# Patient Record
Sex: FEMALE | Race: BLACK OR AFRICAN AMERICAN | NOT HISPANIC OR LATINO | Employment: OTHER | URBAN - METROPOLITAN AREA
[De-identification: names, ages, dates, MRNs, and addresses within clinical notes are randomized per-mention and may not be internally consistent; named-entity substitution may affect disease eponyms.]

---

## 2023-04-24 RX ORDER — LISINOPRIL 40 MG/1
TABLET ORAL
Qty: 90 TABLET | Refills: 0 | OUTPATIENT
Start: 2023-04-24

## 2023-06-02 ENCOUNTER — TELEPHONE (OUTPATIENT)
Dept: PRIMARY CARE | Facility: CLINIC | Age: 68
End: 2023-06-02
Payer: MEDICARE

## 2023-06-02 DIAGNOSIS — I10 HYPERTENSION, UNSPECIFIED TYPE: Primary | ICD-10-CM

## 2023-06-02 RX ORDER — LISINOPRIL 40 MG/1
40 TABLET ORAL DAILY
Qty: 90 TABLET | Refills: 0 | Status: SHIPPED | OUTPATIENT
Start: 2023-06-02 | End: 2023-08-29

## 2023-06-02 RX ORDER — LISINOPRIL 40 MG/1
40 TABLET ORAL DAILY
COMMUNITY
Start: 2018-06-13 | End: 2023-06-02 | Stop reason: SDUPTHER

## 2023-07-19 PROBLEM — F32.A DEPRESSION: Status: ACTIVE | Noted: 2023-07-19

## 2023-07-19 PROBLEM — N64.89 BREAST ASYMMETRY IN FEMALE: Status: ACTIVE | Noted: 2023-07-19

## 2023-07-19 PROBLEM — R89.2 ABNORMAL DRUG SCREEN: Status: ACTIVE | Noted: 2023-07-19

## 2023-07-19 PROBLEM — H90.3 SENSORINEURAL HEARING LOSS, BILATERAL: Status: ACTIVE | Noted: 2023-07-19

## 2023-07-19 PROBLEM — M79.9 POSTURAL STRAIN: Status: ACTIVE | Noted: 2023-07-19

## 2023-07-19 PROBLEM — L29.2 VULVAR ITCHING: Status: ACTIVE | Noted: 2023-07-19

## 2023-07-19 PROBLEM — R09.02 HYPOXEMIA: Status: ACTIVE | Noted: 2023-07-19

## 2023-07-19 PROBLEM — N95.1 MENOPAUSAL SYMPTOMS: Status: ACTIVE | Noted: 2023-07-19

## 2023-07-19 PROBLEM — M47.816 LUMBAR SPONDYLOSIS: Status: ACTIVE | Noted: 2023-07-19

## 2023-07-19 PROBLEM — R51.9 HEADACHE: Status: ACTIVE | Noted: 2023-07-19

## 2023-07-19 PROBLEM — R01.1 UNDIAGNOSED CARDIAC MURMURS: Status: ACTIVE | Noted: 2023-07-19

## 2023-07-19 PROBLEM — M25.552 LEFT HIP PAIN: Status: ACTIVE | Noted: 2023-07-19

## 2023-07-19 PROBLEM — M79.7 FIBROMYALGIA: Status: ACTIVE | Noted: 2023-07-19

## 2023-07-19 PROBLEM — H61.21 IMPACTED CERUMEN OF RIGHT EAR: Status: ACTIVE | Noted: 2023-07-19

## 2023-07-19 PROBLEM — R92.8 ABNORMAL MAMMOGRAM: Status: ACTIVE | Noted: 2023-07-19

## 2023-07-19 PROBLEM — I10 BENIGN ESSENTIAL HYPERTENSION: Status: ACTIVE | Noted: 2023-07-19

## 2023-07-19 PROBLEM — N18.9 CKD (CHRONIC KIDNEY DISEASE): Status: ACTIVE | Noted: 2023-07-19

## 2023-07-19 PROBLEM — M19.049 CMC ARTHRITIS: Status: ACTIVE | Noted: 2023-07-19

## 2023-07-19 PROBLEM — M19.90 ARTHRITIS: Status: ACTIVE | Noted: 2023-07-19

## 2023-07-19 PROBLEM — M20.009 FINGER DEFORMITY: Status: ACTIVE | Noted: 2023-07-19

## 2023-07-19 PROBLEM — E55.9 VITAMIN D DEFICIENCY: Status: ACTIVE | Noted: 2023-07-19

## 2023-07-19 PROBLEM — M79.10 MYALGIA: Status: ACTIVE | Noted: 2023-07-19

## 2023-07-19 PROBLEM — M25.50 MULTIPLE JOINT PAIN: Status: ACTIVE | Noted: 2023-07-19

## 2023-07-19 PROBLEM — R06.09 EXERTIONAL DYSPNEA: Status: ACTIVE | Noted: 2023-07-19

## 2023-07-19 PROBLEM — I73.00 RAYNAUD'S DISEASE: Status: ACTIVE | Noted: 2023-07-19

## 2023-07-19 PROBLEM — M32.9 SYSTEMIC LUPUS ERYTHEMATOSUS (MULTI): Status: ACTIVE | Noted: 2023-07-19

## 2023-07-19 PROBLEM — M54.50 LUMBOSACRAL PAIN: Status: ACTIVE | Noted: 2023-07-19

## 2023-07-19 PROBLEM — G56.21: Status: ACTIVE | Noted: 2023-07-19

## 2023-07-19 PROBLEM — I77.9 PERIPHERAL ARTERIAL OCCLUSIVE DISEASE (CMS-HCC): Status: ACTIVE | Noted: 2023-07-19

## 2023-07-19 PROBLEM — M25.512 SHOULDER PAIN, BILATERAL: Status: ACTIVE | Noted: 2023-07-19

## 2023-07-19 PROBLEM — M65.30 TRIGGER FINGER: Status: ACTIVE | Noted: 2023-07-19

## 2023-07-19 PROBLEM — M11.20 CALCIUM PYROPHOSPHATE DEPOSITION DISEASE (CPPD): Status: ACTIVE | Noted: 2023-07-19

## 2023-07-19 PROBLEM — R63.5 WEIGHT GAIN: Status: ACTIVE | Noted: 2023-07-19

## 2023-07-19 PROBLEM — M87.059 AVASCULAR NECROSIS OF HIP (MULTI): Status: ACTIVE | Noted: 2023-07-19

## 2023-07-19 PROBLEM — F43.23 SITUATIONAL MIXED ANXIETY AND DEPRESSIVE DISORDER: Status: ACTIVE | Noted: 2023-07-19

## 2023-07-19 PROBLEM — G47.00 INSOMNIA: Status: ACTIVE | Noted: 2023-07-19

## 2023-07-19 PROBLEM — H91.90 HEARING LOSS: Status: ACTIVE | Noted: 2023-07-19

## 2023-07-19 PROBLEM — D89.2 PARAPROTEINEMIA: Status: ACTIVE | Noted: 2023-07-19

## 2023-07-19 PROBLEM — M99.09 SEGMENTAL AND SOMATIC DYSFUNCTION: Status: ACTIVE | Noted: 2023-07-19

## 2023-07-19 PROBLEM — M25.511 SHOULDER PAIN, BILATERAL: Status: ACTIVE | Noted: 2023-07-19

## 2023-07-19 PROBLEM — H35.00 RETINOPATHY: Status: ACTIVE | Noted: 2023-07-19

## 2023-07-19 PROBLEM — D47.2 MONOCLONAL GAMMOPATHY: Status: ACTIVE | Noted: 2023-07-19

## 2023-07-19 PROBLEM — M25.562 LEFT KNEE PAIN: Status: ACTIVE | Noted: 2023-07-19

## 2023-07-19 RX ORDER — VIT C/E/ZN/COPPR/LUTEIN/ZEAXAN 250MG-90MG
CAPSULE ORAL 2 TIMES DAILY
COMMUNITY

## 2023-07-19 RX ORDER — FLAXSEED OIL 1000 MG
CAPSULE ORAL
COMMUNITY
End: 2023-09-19 | Stop reason: ALTCHOICE

## 2023-07-19 RX ORDER — FOLIC ACID 1 MG/1
TABLET ORAL
COMMUNITY
Start: 2016-03-08

## 2023-07-19 RX ORDER — CLOBETASOL PROPIONATE 0.5 MG/G
OINTMENT TOPICAL 2 TIMES DAILY
COMMUNITY
Start: 2022-05-25 | End: 2023-09-19 | Stop reason: ALTCHOICE

## 2023-07-19 RX ORDER — CALCIUM CARBONATE 600 MG
1 TABLET ORAL 2 TIMES DAILY
COMMUNITY
Start: 2015-03-24

## 2023-07-19 RX ORDER — DULOXETIN HYDROCHLORIDE 60 MG/1
1 CAPSULE, DELAYED RELEASE ORAL 2 TIMES DAILY
COMMUNITY
Start: 2022-02-16 | End: 2024-02-16 | Stop reason: SDUPTHER

## 2023-07-19 RX ORDER — MIRTAZAPINE 7.5 MG/1
TABLET, FILM COATED ORAL
COMMUNITY
Start: 2023-05-19 | End: 2023-09-19

## 2023-07-19 RX ORDER — MULTIVIT-MIN/IRON FUM/FOLIC AC 7.5 MG-4
1 TABLET ORAL DAILY
COMMUNITY

## 2023-07-19 RX ORDER — COLCHICINE 0.6 MG/1
1 TABLET ORAL DAILY
COMMUNITY
Start: 2021-11-17 | End: 2023-09-19 | Stop reason: ALTCHOICE

## 2023-07-19 RX ORDER — OMEGA-3 FATTY ACIDS/FISH OIL 340-1000MG
CAPSULE ORAL
COMMUNITY

## 2023-07-19 RX ORDER — ESTRADIOL 0.5 MG/1
1 TABLET ORAL DAILY
COMMUNITY
Start: 2016-03-14 | End: 2023-09-19 | Stop reason: ALTCHOICE

## 2023-07-19 RX ORDER — METHOTREXATE 2.5 MG/1
TABLET ORAL
COMMUNITY
Start: 2021-09-20 | End: 2023-09-19 | Stop reason: SDUPTHER

## 2023-07-19 RX ORDER — EPINEPHRINE 0.22MG
AEROSOL WITH ADAPTER (ML) INHALATION
COMMUNITY
End: 2023-09-19 | Stop reason: ALTCHOICE

## 2023-07-19 RX ORDER — FAMOTIDINE 40 MG/1
1 TABLET, FILM COATED ORAL NIGHTLY
COMMUNITY
Start: 2019-12-03 | End: 2023-10-16

## 2023-07-19 RX ORDER — POLYETHYLENE GLYCOL 3350, SODIUM SULFATE ANHYDROUS, SODIUM BICARBONATE, SODIUM CHLORIDE, POTASSIUM CHLORIDE 236; 22.74; 6.74; 5.86; 2.97 G/4L; G/4L; G/4L; G/4L; G/4L
POWDER, FOR SOLUTION ORAL
COMMUNITY
Start: 2022-05-05 | End: 2023-09-19 | Stop reason: ALTCHOICE

## 2023-07-19 RX ORDER — TRAMADOL HYDROCHLORIDE 50 MG/1
TABLET ORAL 2 TIMES DAILY
COMMUNITY
Start: 2019-12-10 | End: 2023-09-19 | Stop reason: ALTCHOICE

## 2023-07-19 RX ORDER — MULTIVIT WITH MINERALS/HERBS
1 TABLET ORAL DAILY
COMMUNITY

## 2023-07-19 RX ORDER — BUPROPION HYDROCHLORIDE 150 MG/1
150 TABLET ORAL
COMMUNITY
End: 2023-11-03 | Stop reason: ALTCHOICE

## 2023-07-21 ENCOUNTER — APPOINTMENT (OUTPATIENT)
Dept: PRIMARY CARE | Facility: CLINIC | Age: 68
End: 2023-07-21
Payer: MEDICARE

## 2023-08-28 DIAGNOSIS — I10 HYPERTENSION, UNSPECIFIED TYPE: ICD-10-CM

## 2023-08-29 RX ORDER — LISINOPRIL 40 MG/1
40 TABLET ORAL DAILY
Qty: 90 TABLET | Refills: 0 | Status: SHIPPED | OUTPATIENT
Start: 2023-08-29 | End: 2023-09-19 | Stop reason: SINTOL

## 2023-09-07 DIAGNOSIS — I10 HYPERTENSION, UNSPECIFIED TYPE: ICD-10-CM

## 2023-09-08 RX ORDER — LISINOPRIL 40 MG/1
40 TABLET ORAL DAILY
Qty: 90 TABLET | Refills: 1 | OUTPATIENT
Start: 2023-09-08

## 2023-09-19 ENCOUNTER — OFFICE VISIT (OUTPATIENT)
Dept: PRIMARY CARE | Facility: CLINIC | Age: 68
End: 2023-09-19
Payer: MEDICARE

## 2023-09-19 VITALS
OXYGEN SATURATION: 82 % | HEART RATE: 62 BPM | BODY MASS INDEX: 28.16 KG/M2 | TEMPERATURE: 97.5 F | WEIGHT: 153 LBS | HEIGHT: 62 IN | DIASTOLIC BLOOD PRESSURE: 54 MMHG | RESPIRATION RATE: 16 BRPM | SYSTOLIC BLOOD PRESSURE: 86 MMHG

## 2023-09-19 DIAGNOSIS — Z78.0 MENOPAUSE: ICD-10-CM

## 2023-09-19 DIAGNOSIS — R53.83 OTHER FATIGUE: ICD-10-CM

## 2023-09-19 DIAGNOSIS — I10 BENIGN ESSENTIAL HYPERTENSION: ICD-10-CM

## 2023-09-19 DIAGNOSIS — Z12.31 SCREENING MAMMOGRAM, ENCOUNTER FOR: ICD-10-CM

## 2023-09-19 DIAGNOSIS — N18.9 CHRONIC KIDNEY DISEASE, UNSPECIFIED CKD STAGE: ICD-10-CM

## 2023-09-19 DIAGNOSIS — M85.80 OSTEOPENIA, UNSPECIFIED LOCATION: ICD-10-CM

## 2023-09-19 DIAGNOSIS — M32.9 SYSTEMIC LUPUS ERYTHEMATOSUS, UNSPECIFIED SLE TYPE, UNSPECIFIED ORGAN INVOLVEMENT STATUS (MULTI): Primary | ICD-10-CM

## 2023-09-19 DIAGNOSIS — R42 DIZZINESS: ICD-10-CM

## 2023-09-19 DIAGNOSIS — Z12.11 COLON CANCER SCREENING: ICD-10-CM

## 2023-09-19 DIAGNOSIS — I95.1 ORTHOSTATIC HYPOTENSION: ICD-10-CM

## 2023-09-19 DIAGNOSIS — E55.9 VITAMIN D DEFICIENCY: ICD-10-CM

## 2023-09-19 DIAGNOSIS — F32.A DEPRESSION, UNSPECIFIED DEPRESSION TYPE: ICD-10-CM

## 2023-09-19 DIAGNOSIS — D47.2 MONOCLONAL GAMMOPATHY: ICD-10-CM

## 2023-09-19 PROBLEM — R06.09 EXERTIONAL DYSPNEA: Status: RESOLVED | Noted: 2023-07-19 | Resolved: 2023-09-19

## 2023-09-19 PROBLEM — R09.02 HYPOXEMIA: Status: RESOLVED | Noted: 2023-07-19 | Resolved: 2023-09-19

## 2023-09-19 PROBLEM — L29.2 VULVAR ITCHING: Status: RESOLVED | Noted: 2023-07-19 | Resolved: 2023-09-19

## 2023-09-19 PROBLEM — R89.2 ABNORMAL DRUG SCREEN: Status: RESOLVED | Noted: 2023-07-19 | Resolved: 2023-09-19

## 2023-09-19 PROBLEM — R92.8 ABNORMAL MAMMOGRAM: Status: RESOLVED | Noted: 2023-07-19 | Resolved: 2023-09-19

## 2023-09-19 PROBLEM — R51.9 HEADACHE: Status: RESOLVED | Noted: 2023-07-19 | Resolved: 2023-09-19

## 2023-09-19 PROCEDURE — 1036F TOBACCO NON-USER: CPT | Performed by: PEDIATRICS

## 2023-09-19 PROCEDURE — 99213 OFFICE O/P EST LOW 20 MIN: CPT | Performed by: PEDIATRICS

## 2023-09-19 PROCEDURE — 3078F DIAST BP <80 MM HG: CPT | Performed by: PEDIATRICS

## 2023-09-19 PROCEDURE — 1170F FXNL STATUS ASSESSED: CPT | Performed by: PEDIATRICS

## 2023-09-19 PROCEDURE — 85025 COMPLETE CBC W/AUTO DIFF WBC: CPT | Performed by: PEDIATRICS

## 2023-09-19 PROCEDURE — G0439 PPPS, SUBSEQ VISIT: HCPCS | Performed by: PEDIATRICS

## 2023-09-19 PROCEDURE — 80053 COMPREHEN METABOLIC PANEL: CPT | Performed by: PEDIATRICS

## 2023-09-19 PROCEDURE — 3074F SYST BP LT 130 MM HG: CPT | Performed by: PEDIATRICS

## 2023-09-19 PROCEDURE — 84443 ASSAY THYROID STIM HORMONE: CPT | Performed by: PEDIATRICS

## 2023-09-19 PROCEDURE — 1125F AMNT PAIN NOTED PAIN PRSNT: CPT | Performed by: PEDIATRICS

## 2023-09-19 PROCEDURE — 82306 VITAMIN D 25 HYDROXY: CPT | Performed by: PEDIATRICS

## 2023-09-19 PROCEDURE — 90662 IIV NO PRSV INCREASED AG IM: CPT | Performed by: PEDIATRICS

## 2023-09-19 PROCEDURE — G0008 ADMIN INFLUENZA VIRUS VAC: HCPCS | Performed by: PEDIATRICS

## 2023-09-19 RX ORDER — METHOTREXATE 2.5 MG/1
TABLET ORAL
Qty: 12 TABLET | Refills: 2 | OUTPATIENT
Start: 2023-09-19 | End: 2023-12-14 | Stop reason: ALTCHOICE

## 2023-09-19 RX ORDER — TRAMADOL HYDROCHLORIDE 50 MG/1
50 TABLET ORAL EVERY 8 HOURS PRN
Qty: 10 TABLET | Refills: 0 | OUTPATIENT
Start: 2023-09-19 | End: 2023-10-03 | Stop reason: SDUPTHER

## 2023-09-19 ASSESSMENT — ACTIVITIES OF DAILY LIVING (ADL)
TAKING_MEDICATION: INDEPENDENT
BATHING: INDEPENDENT
DOING_HOUSEWORK: NEEDS ASSISTANCE
DRESSING: INDEPENDENT
GROCERY_SHOPPING: NEEDS ASSISTANCE
MANAGING_FINANCES: INDEPENDENT

## 2023-09-19 ASSESSMENT — PATIENT HEALTH QUESTIONNAIRE - PHQ9
2. FEELING DOWN, DEPRESSED OR HOPELESS: NOT AT ALL
SUM OF ALL RESPONSES TO PHQ9 QUESTIONS 1 AND 2: 0
1. LITTLE INTEREST OR PLEASURE IN DOING THINGS: NOT AT ALL

## 2023-09-19 NOTE — PATIENT INSTRUCTIONS
Stop Lisinopril and monitor blood pressure  See rheumatologist  Eat 3 meals a day  Return in 3 months  Go to ER due to low BP when you stand up (told son to take her)

## 2023-09-19 NOTE — PROGRESS NOTES
"Subjective   Reason for Visit: Roselyn Muhammad is an 68 y.o. female here for a Medicare Wellness visit.               HPI  Patient complains of dizziness today; has been on and off for a week; When she went to grocery store last month, she had to call her son  because she got too dizzy. She went to nail salon today. When she tried to get up to get into her van, she felt dizzy. She was dizzy walking from waiting room to room. Eats mostly once a day breakfast foods. She has managed to lose weight.  Had infection in colon and was in [facility] last year she thinks  Patient Care Team:  Cecily Jolley MD as PCP - General (Pediatrics)  Cecily Jolley MD as PCP - [employer] PCP     Review of Systems  Had epigastric pain last week for 2 days  Objective   Vitals:  BP 89/55 (Patient Position: Standing)   Pulse (!) 120   Temp 36.4 °C (97.5 °F)   Ht 1.568 m (5' 1.75\")   Wt 69.4 kg (153 lb)   SpO2 95%   BMI 28.21 kg/m²     Sitting BP normal  Physical Exam  Vitals reviewed.   Constitutional:       General: She is not in acute distress.  HENT:      Head: Normocephalic.      Right Ear: Tympanic membrane normal.      Left Ear: Tympanic membrane normal.      Nose: Nose normal.      Mouth/Throat:      Pharynx: Oropharynx is clear.   Cardiovascular:      Rate and Rhythm: Normal rate and regular rhythm.      Heart sounds: Normal heart sounds.   Pulmonary:      Breath sounds: Normal breath sounds.   Abdominal:      Palpations: Abdomen is soft.      Tenderness: There is no abdominal tenderness.   Musculoskeletal:         General: No tenderness.   Skin:     Findings: No rash.   Neurological:      General: No focal deficit present.      Mental Status: She is alert.   Psychiatric:         Mood and Affect: Mood normal.         Assessment/Plan   Problem List Items Addressed This Visit    None    Problem List Items Addressed This Visit       Benign essential hypertension    Current Assessment & Plan     BP too low; " will stop Lisinopril today and have her monitor         CKD (chronic kidney disease)    Monoclonal gammopathy    Current Assessment & Plan     Went to [facility];          Depression    Systemic lupus erythematosus (CMS/HCC) - Primary    Vitamin D deficiency    Dizziness    Other fatigue     Other Visit Diagnoses       Colon cancer screening        Screening mammogram, encounter for        Menopause

## 2023-09-21 ENCOUNTER — TELEPHONE (OUTPATIENT)
Dept: PRIMARY CARE | Facility: CLINIC | Age: 68
End: 2023-09-21
Payer: MEDICARE

## 2023-09-21 NOTE — TELEPHONE ENCOUNTER
MARIBEL CALLED , SHE IS BEING DISCHARGED FROM HOSPITAL TODAY, HAVE YOU FINISHED PAPER WORK FOR HOME CARE .  CALL BACK MARIBEL @ [phone]-IN HOSPITAL - TILL EVENING PLEASE CALL ASAP..

## 2023-09-21 NOTE — TELEPHONE ENCOUNTER
Dr. Jolley  Home Care Intake Dept. Left a voicemail message calling regarding the referral for Roselyn Muhammad need face to face and review of systems included in note, This referral will close in the next 24 hours. If with questions call [phone]  Fax [phone].

## 2023-09-22 ENCOUNTER — TELEPHONE (OUTPATIENT)
Dept: PRIMARY CARE | Facility: CLINIC | Age: 68
End: 2023-09-22
Payer: MEDICARE

## 2023-09-22 NOTE — TELEPHONE ENCOUNTER
PATIENT STATES SHE NEEDS PAPER WORK TAKEN CARE OF   FAXED OVER  TO GET HER BENEFITS FOR , MEALS ON WHEELS, HOME HEALTH AID, HOUSE AID SHE NEED THE INITIAL REFERRAL. FAXED .- ASAP  PATIENT WAS CALLING TO SEE IF YOU CAN COMPLETE THIS.

## 2023-09-22 NOTE — TELEPHONE ENCOUNTER
Dr. Jolley, Sylvie  [facility] left  a voicemail message saying that  she's calling to up date you about Roselyn Muhammad was discharged on 9/21/23 if with questions call Sylvie back at [phone].

## 2023-10-03 ENCOUNTER — HOME HEALTH ADMISSION (OUTPATIENT)
Dept: HOME HEALTH SERVICES | Facility: HOME HEALTH | Age: 68
End: 2023-10-03
Payer: MEDICARE

## 2023-10-03 DIAGNOSIS — M32.9 SYSTEMIC LUPUS ERYTHEMATOSUS, UNSPECIFIED SLE TYPE, UNSPECIFIED ORGAN INVOLVEMENT STATUS (MULTI): ICD-10-CM

## 2023-10-04 RX ORDER — TRAMADOL HYDROCHLORIDE 50 MG/1
50 TABLET ORAL EVERY 8 HOURS PRN
Qty: 10 TABLET | Refills: 0 | Status: SHIPPED | OUTPATIENT
Start: 2023-10-04 | End: 2023-10-05 | Stop reason: SDUPTHER

## 2023-10-05 ENCOUNTER — APPOINTMENT (OUTPATIENT)
Dept: PRIMARY CARE | Facility: CLINIC | Age: 68
End: 2023-10-05
Payer: MEDICARE

## 2023-10-05 DIAGNOSIS — M32.9 SYSTEMIC LUPUS ERYTHEMATOSUS, UNSPECIFIED SLE TYPE, UNSPECIFIED ORGAN INVOLVEMENT STATUS (MULTI): ICD-10-CM

## 2023-10-05 PROBLEM — R06.09 EXERTIONAL DYSPNEA: Status: ACTIVE | Noted: 2023-10-05

## 2023-10-05 PROBLEM — S37.009A INJURY OF KIDNEY: Status: ACTIVE | Noted: 2023-10-05

## 2023-10-05 PROBLEM — R89.2 ABNORMAL DRUG SCREEN: Status: ACTIVE | Noted: 2023-10-05

## 2023-10-05 PROBLEM — M25.50 JOINT PAIN: Status: ACTIVE | Noted: 2023-10-05

## 2023-10-05 PROBLEM — R09.02 HYPOXEMIA: Status: ACTIVE | Noted: 2023-10-05

## 2023-10-05 PROBLEM — S09.90XA CLOSED HEAD INJURY: Status: ACTIVE | Noted: 2021-08-26

## 2023-10-05 PROBLEM — R51.9 HEADACHE: Status: ACTIVE | Noted: 2023-10-05

## 2023-10-05 PROBLEM — R92.8 ABNORMAL MAMMOGRAM: Status: ACTIVE | Noted: 2023-10-05

## 2023-10-05 PROBLEM — M81.8 IDIOPATHIC OSTEOPOROSIS: Status: ACTIVE | Noted: 2023-10-05

## 2023-10-05 RX ORDER — ESTRADIOL 0.5 MG/1
0.5 TABLET ORAL DAILY
COMMUNITY

## 2023-10-05 RX ORDER — BUPROPION HYDROCHLORIDE 150 MG/1
150 TABLET, EXTENDED RELEASE ORAL 2 TIMES DAILY
COMMUNITY
Start: 2023-07-28 | End: 2024-01-01 | Stop reason: SDUPTHER

## 2023-10-05 RX ORDER — LISINOPRIL 40 MG/1
40 TABLET ORAL DAILY
COMMUNITY
End: 2024-01-01 | Stop reason: SINTOL

## 2023-10-05 RX ORDER — BUPROPION HYDROCHLORIDE 100 MG/1
100 TABLET, EXTENDED RELEASE ORAL DAILY
COMMUNITY
End: 2023-11-03 | Stop reason: ALTCHOICE

## 2023-10-05 RX ORDER — TRAMADOL HYDROCHLORIDE 50 MG/1
50 TABLET ORAL EVERY 8 HOURS PRN
Qty: 10 TABLET | Refills: 0 | Status: SHIPPED | OUTPATIENT
Start: 2023-10-05 | End: 2023-10-09 | Stop reason: SDUPTHER

## 2023-10-05 RX ORDER — MIRTAZAPINE 7.5 MG/1
0.5 TABLET, FILM COATED ORAL NIGHTLY PRN
COMMUNITY

## 2023-10-05 RX ORDER — COLCHICINE 0.6 MG/1
0.6 TABLET ORAL DAILY
COMMUNITY

## 2023-10-09 DIAGNOSIS — M32.9 SYSTEMIC LUPUS ERYTHEMATOSUS, UNSPECIFIED SLE TYPE, UNSPECIFIED ORGAN INVOLVEMENT STATUS (MULTI): ICD-10-CM

## 2023-10-09 RX ORDER — TRAMADOL HYDROCHLORIDE 50 MG/1
50 TABLET ORAL 2 TIMES DAILY
Qty: 60 TABLET | Refills: 0 | Status: SHIPPED | OUTPATIENT
Start: 2023-10-09 | End: 2023-10-26 | Stop reason: SDUPTHER

## 2023-10-10 ENCOUNTER — APPOINTMENT (OUTPATIENT)
Dept: PRIMARY CARE | Facility: CLINIC | Age: 68
End: 2023-10-10
Payer: MEDICARE

## 2023-10-13 ENCOUNTER — APPOINTMENT (OUTPATIENT)
Dept: CARDIOLOGY | Facility: CLINIC | Age: 68
End: 2023-10-13
Payer: MEDICARE

## 2023-10-13 DIAGNOSIS — K29.70 GASTRITIS WITHOUT BLEEDING, UNSPECIFIED CHRONICITY, UNSPECIFIED GASTRITIS TYPE: Primary | ICD-10-CM

## 2023-10-16 RX ORDER — FAMOTIDINE 40 MG/1
40 TABLET, FILM COATED ORAL NIGHTLY
Qty: 90 TABLET | Refills: 3 | Status: SHIPPED | OUTPATIENT
Start: 2023-10-16 | End: 2024-03-05 | Stop reason: SDUPTHER

## 2023-10-20 ENCOUNTER — ALLIED HEALTH (OUTPATIENT)
Dept: INTEGRATIVE MEDICINE | Facility: CLINIC | Age: 68
End: 2023-10-20
Payer: MEDICARE

## 2023-10-20 DIAGNOSIS — M99.05 SEGMENTAL AND SOMATIC DYSFUNCTION OF PELVIC REGION: ICD-10-CM

## 2023-10-20 DIAGNOSIS — M79.10 MYALGIA: ICD-10-CM

## 2023-10-20 DIAGNOSIS — M99.03 SEGMENTAL AND SOMATIC DYSFUNCTION OF LUMBAR REGION: Primary | ICD-10-CM

## 2023-10-20 DIAGNOSIS — M54.50 LUMBOSACRAL PAIN: ICD-10-CM

## 2023-10-20 DIAGNOSIS — M79.9 POSTURAL STRAIN: ICD-10-CM

## 2023-10-20 DIAGNOSIS — M99.04 SEGMENTAL AND SOMATIC DYSFUNCTION OF SACRAL REGION: ICD-10-CM

## 2023-10-20 PROCEDURE — 98941 CHIROPRACT MANJ 3-4 REGIONS: CPT | Performed by: CHIROPRACTOR

## 2023-10-20 NOTE — PROGRESS NOTES
Subjective   Patient ID: Roselyn Muhammad is a 68 y.o. female who presents October 20, 2023 for low back pain.    No limit-Medicare    Today, the patient rates their degree of pain as a 3 out of 10 on the numeric pain rating scale.     Roselyn returns for continued treatment of low back pain. Patient states that she has mild low back pain today. She states that she has had occasional low back pain over the past month. She states that she was in the ED about a month ago with chest wall pain and very low blood pressure. She has been wearing a halter monitor and was recommended to eat more and increase her water intake. Denies any associated symptoms or change in medical history since last visit and will follow up in 2 weeks.           Objective   Physical Exam  Constitutional:       General: She is not in acute distress.     Appearance: Normal appearance.       HENT:      Head: Normocephalic and atraumatic.   Musculoskeletal:      Lumbar back: Tenderness and bony tenderness present. Decreased range of motion.   Neurological:      General: No focal deficit present.      Mental Status: She is alert and oriented to person, place, and time.      Cranial Nerves: No dysarthria or facial asymmetry.      Sensory: Sensation is intact.      Motor: Motor function is intact.      Coordination: Coordination is intact.      Gait: Gait is intact.   Psychiatric:         Attention and Perception: Attention normal.         Mood and Affect: Mood normal.         Speech: Speech normal.         Behavior: Behavior is cooperative.         Palpation of the following region(s) revealed:      Lumbar: Lumbar paraspinals bilateral, hypertonicity and tenderness.  Quadratus lumborum bilateral, hypertonicity and tenderness.        Segmental Joint(s): Segmental joint dysfunction was assessed with motion palpation and is identified in the following areas:  Lumbopelvic / Sacral SIJ : L4, L5, L5/S1, R SIJ, and L SIJ      Assessment/Plan   Today's Treatment  Included: Chiropractic manipulation to the  Segmental Joint(s) Lumbopelvic/Sacral SIJ : L4, L5, L5/S1, R SIJ, and L SIJ    Treatment Techniques Used : Diversified CMT, Pelvic drop table technique, and Manual traction  Ischemic compression    Soft-tissue mobilization was performed in the following areas:       Lumbar Paraspinal mm. bilateral and Quadratus Lumborum bilateral            The patient tolerated today's treatment with little or no additional discomfort and was instructed to contact the office for questions or concerns.

## 2023-10-26 ENCOUNTER — OFFICE VISIT (OUTPATIENT)
Dept: PAIN MEDICINE | Facility: CLINIC | Age: 68
End: 2023-10-26
Payer: MEDICARE

## 2023-10-26 VITALS
SYSTOLIC BLOOD PRESSURE: 157 MMHG | BODY MASS INDEX: 28.16 KG/M2 | HEIGHT: 62 IN | WEIGHT: 153 LBS | HEART RATE: 94 BPM | DIASTOLIC BLOOD PRESSURE: 80 MMHG | RESPIRATION RATE: 19 BRPM

## 2023-10-26 DIAGNOSIS — M32.9 SYSTEMIC LUPUS ERYTHEMATOSUS, UNSPECIFIED SLE TYPE, UNSPECIFIED ORGAN INVOLVEMENT STATUS (MULTI): ICD-10-CM

## 2023-10-26 DIAGNOSIS — M19.90 ARTHRITIS: Primary | ICD-10-CM

## 2023-10-26 PROCEDURE — 1159F MED LIST DOCD IN RCRD: CPT | Performed by: PAIN MEDICINE

## 2023-10-26 PROCEDURE — 1125F AMNT PAIN NOTED PAIN PRSNT: CPT | Performed by: PAIN MEDICINE

## 2023-10-26 PROCEDURE — 3079F DIAST BP 80-89 MM HG: CPT | Performed by: PAIN MEDICINE

## 2023-10-26 PROCEDURE — 99212 OFFICE O/P EST SF 10 MIN: CPT | Performed by: PAIN MEDICINE

## 2023-10-26 PROCEDURE — 3077F SYST BP >= 140 MM HG: CPT | Performed by: PAIN MEDICINE

## 2023-10-26 PROCEDURE — 1036F TOBACCO NON-USER: CPT | Performed by: PAIN MEDICINE

## 2023-10-26 RX ORDER — TRAMADOL HYDROCHLORIDE 50 MG/1
50 TABLET ORAL 2 TIMES DAILY
Qty: 60 TABLET | Refills: 0 | Status: SHIPPED | OUTPATIENT
Start: 2023-11-09 | End: 2023-12-05 | Stop reason: SDUPTHER

## 2023-10-26 ASSESSMENT — PAIN SCALES - GENERAL
PAINLEVEL_OUTOF10: 5 - MODERATE PAIN
PAINLEVEL: 5

## 2023-10-26 ASSESSMENT — PAIN DESCRIPTION - DESCRIPTORS: DESCRIPTORS: ACHING

## 2023-10-26 ASSESSMENT — PAIN - FUNCTIONAL ASSESSMENT: PAIN_FUNCTIONAL_ASSESSMENT: 0-10

## 2023-10-26 NOTE — PROGRESS NOTES
10/26/2023    Ms. Muhammad is here today as follow up and renew meds contract    HPI    69 y/o female patient with history of low back and left leg pain, systemic lupus, arthritis, fibromyalgia, insomnia, and depression and bilateral shoulder arthritis who presents for a virtual visit.     patient states pain is stable on her current medication regimen. She report that she is having more headache episodes that shoulder or back pain, She report that her pain is doing ok and that she takes tramadol 50mg BID, meloxicam, mirtazapine qHS, duloxetine 60mg. She report that she is only doing some warmup exercises and stretching but that she plans to start on doing home exercises today. She denies any side effects from the tramadol.     Plan     Continue current treatment with tramadol 50 mg twice daily as needed  - Continue home exercises   - Patient has declined physical for now

## 2023-10-27 ENCOUNTER — APPOINTMENT (OUTPATIENT)
Dept: PRIMARY CARE | Facility: CLINIC | Age: 68
End: 2023-10-27
Payer: MEDICARE

## 2023-10-30 ENCOUNTER — HOSPITAL ENCOUNTER (OUTPATIENT)
Dept: CARDIOLOGY | Facility: HOSPITAL | Age: 68
Discharge: HOME | End: 2023-10-30
Payer: MEDICARE

## 2023-10-30 DIAGNOSIS — R55 SYNCOPE AND COLLAPSE: ICD-10-CM

## 2023-10-30 PROCEDURE — 93228 REMOTE 30 DAY ECG REV/REPORT: CPT | Performed by: INTERNAL MEDICINE

## 2023-11-03 ENCOUNTER — ALLIED HEALTH (OUTPATIENT)
Dept: INTEGRATIVE MEDICINE | Facility: CLINIC | Age: 68
End: 2023-11-03
Payer: MEDICARE

## 2023-11-03 ENCOUNTER — TELEPHONE (OUTPATIENT)
Dept: BEHAVIORAL HEALTH | Facility: CLINIC | Age: 68
End: 2023-11-03

## 2023-11-03 DIAGNOSIS — M99.03 SEGMENTAL AND SOMATIC DYSFUNCTION OF LUMBAR REGION: Primary | ICD-10-CM

## 2023-11-03 DIAGNOSIS — M99.02 SEGMENTAL AND SOMATIC DYSFUNCTION OF THORACIC REGION: ICD-10-CM

## 2023-11-03 DIAGNOSIS — M79.9 POSTURAL STRAIN: ICD-10-CM

## 2023-11-03 DIAGNOSIS — M79.10 MYALGIA: ICD-10-CM

## 2023-11-03 DIAGNOSIS — M99.05 SEGMENTAL AND SOMATIC DYSFUNCTION OF PELVIC REGION: ICD-10-CM

## 2023-11-03 DIAGNOSIS — M54.50 LUMBOSACRAL PAIN: ICD-10-CM

## 2023-11-03 DIAGNOSIS — M99.04 SEGMENTAL AND SOMATIC DYSFUNCTION OF SACRAL REGION: ICD-10-CM

## 2023-11-03 DIAGNOSIS — F32.89 OTHER DEPRESSION: ICD-10-CM

## 2023-11-03 PROCEDURE — 98941 CHIROPRACT MANJ 3-4 REGIONS: CPT | Performed by: CHIROPRACTOR

## 2023-11-03 RX ORDER — BUPROPION HYDROCHLORIDE 100 MG/1
100 TABLET, EXTENDED RELEASE ORAL DAILY
Qty: 90 TABLET | Refills: 1 | Status: SHIPPED | OUTPATIENT
Start: 2023-11-03 | End: 2024-02-16 | Stop reason: SDUPTHER

## 2023-11-03 NOTE — PROGRESS NOTES
Medication Request:   buPROPion SR (Wellbutrin SR) 100 mg 12 hr tablet    Pharmacy: [facility] - [place], [place] - [phone]     Thank you!

## 2023-11-03 NOTE — PROGRESS NOTES
Subjective   Patient ID: Roselyn Muhammad is a 68 y.o. female who presents November 3, 2023 for low back pain.    No limit-Medicare    Today, the patient rates their degree of pain as a 3 out of 10 on the numeric pain rating scale.     Roselyn returns for continued treatment of low back pain. Patient states that she had increased low back pain one day earlier in the week, but have dissipated by today. She states that she woke up this morning with upper back discomfort that has started to lessen. Denies any associated symptoms or change in medical history since last visit and will follow up in 2 weeks.           Objective   Physical Exam  Constitutional:       General: She is not in acute distress.     Appearance: Normal appearance.       HENT:      Head: Normocephalic and atraumatic.   Musculoskeletal:      Lumbar back: Tenderness and bony tenderness present. Decreased range of motion.   Neurological:      General: No focal deficit present.      Mental Status: She is alert and oriented to person, place, and time.      Cranial Nerves: No dysarthria or facial asymmetry.      Sensory: Sensation is intact.      Motor: Motor function is intact.      Coordination: Coordination is intact.      Gait: Gait is intact.   Psychiatric:         Attention and Perception: Attention normal.         Mood and Affect: Mood normal.         Speech: Speech normal.         Behavior: Behavior is cooperative.         Palpation of the following region(s) revealed:      Lumbar: Lumbar paraspinals bilateral, hypertonicity and tenderness.  Quadratus lumborum bilateral, hypertonicity and tenderness.        Segmental Joint(s): Segmental joint dysfunction was assessed with motion palpation and is identified in the following areas:  Thoracic : T3 and T4  Lumbopelvic / Sacral SIJ : L4, L5, L5/S1, R SIJ, and L SIJ      Assessment/Plan   Today's Treatment Included: Chiropractic manipulation to the  Segmental Joint(s) Lumbopelvic/Sacral SIJ : L4, L5,  L5/S1, R SIJ, and L SIJ Segmental Joint(s) Thoracic : T3 and T4 Segmental Joints Upper/Lower Extremities : L Knee, R Ankle, and L Ankle  Treatment Techniques Used : Diversified CMT, Pelvic drop table technique, and Manual traction  Ischemic compression    Soft-tissue mobilization was performed in the following areas:       Lumbar Paraspinal mm. bilateral and Quadratus Lumborum bilateral            The patient tolerated today's treatment with little or no additional discomfort and was instructed to contact the office for questions or concerns.

## 2023-11-07 NOTE — TELEPHONE ENCOUNTER
Pt called for medication refill TRAMADOL 50 MG TAKE ONE TABLET 2 TIMES DAILY WAS SENT TO PHARMACY ON 11/9/23.

## 2023-11-16 ENCOUNTER — APPOINTMENT (OUTPATIENT)
Dept: CARDIOLOGY | Facility: CLINIC | Age: 68
End: 2023-11-16
Payer: MEDICARE

## 2023-11-17 ENCOUNTER — APPOINTMENT (OUTPATIENT)
Dept: INTEGRATIVE MEDICINE | Facility: CLINIC | Age: 68
End: 2023-11-17
Payer: MEDICARE

## 2023-11-29 ENCOUNTER — APPOINTMENT (OUTPATIENT)
Dept: RADIOLOGY | Facility: CLINIC | Age: 68
End: 2023-11-29
Payer: MEDICARE

## 2023-11-29 ENCOUNTER — ALLIED HEALTH (OUTPATIENT)
Dept: INTEGRATIVE MEDICINE | Facility: CLINIC | Age: 68
End: 2023-11-29
Payer: MEDICARE

## 2023-11-29 DIAGNOSIS — M99.02 SEGMENTAL AND SOMATIC DYSFUNCTION OF THORACIC REGION: ICD-10-CM

## 2023-11-29 DIAGNOSIS — M99.03 SEGMENTAL AND SOMATIC DYSFUNCTION OF LUMBAR REGION: Primary | ICD-10-CM

## 2023-11-29 DIAGNOSIS — M54.50 LUMBOSACRAL PAIN: ICD-10-CM

## 2023-11-29 DIAGNOSIS — M79.10 MYALGIA: ICD-10-CM

## 2023-11-29 DIAGNOSIS — M99.05 SEGMENTAL AND SOMATIC DYSFUNCTION OF PELVIC REGION: ICD-10-CM

## 2023-11-29 DIAGNOSIS — M79.9 POSTURAL STRAIN: ICD-10-CM

## 2023-11-29 DIAGNOSIS — M99.04 SEGMENTAL AND SOMATIC DYSFUNCTION OF SACRAL REGION: ICD-10-CM

## 2023-11-29 PROCEDURE — 98941 CHIROPRACT MANJ 3-4 REGIONS: CPT | Performed by: CHIROPRACTOR

## 2023-11-29 NOTE — PROGRESS NOTES
Subjective   Patient ID: Roselyn Muhammad is a 68 y.o. female who presents November 29, 2023 for low back pain.    No limit-Medicare    Today, the patient rates their degree of pain as a 3 out of 10 on the numeric pain rating scale.     Roselyn returns for continued treatment of low back pain. Patient states that she has had improvement in mid back symptoms since last visit. She states that she has had occasional low back discomfort. Denies any associated symptoms or change in medical history since last visit and will follow up in 2 weeks.           Objective   Physical Exam  Constitutional:       General: She is not in acute distress.     Appearance: Normal appearance.       HENT:      Head: Normocephalic and atraumatic.   Musculoskeletal:      Lumbar back: Tenderness and bony tenderness present. Decreased range of motion.   Neurological:      General: No focal deficit present.      Mental Status: She is alert and oriented to person, place, and time.      Cranial Nerves: No dysarthria or facial asymmetry.      Sensory: Sensation is intact.      Motor: Motor function is intact.      Coordination: Coordination is intact.      Gait: Gait is intact.   Psychiatric:         Attention and Perception: Attention normal.         Mood and Affect: Mood normal.         Speech: Speech normal.         Behavior: Behavior is cooperative.         Palpation of the following region(s) revealed:      Lumbar: Lumbar paraspinals bilateral, hypertonicity and tenderness.  Quadratus lumborum bilateral, hypertonicity and tenderness.        Segmental Joint(s): Segmental joint dysfunction was assessed with motion palpation and is identified in the following areas:  Thoracic : T3 and T4  Lumbopelvic / Sacral SIJ : L4, L5, L5/S1, R SIJ, and L SIJ      Assessment/Plan   Today's Treatment Included: Chiropractic manipulation to the  Segmental Joint(s) Lumbopelvic/Sacral SIJ : L4, L5, L5/S1, R SIJ, and L SIJ Segmental Joint(s) Thoracic : T3 and T4  Segmental Joints Upper/Lower Extremities : L Knee, R Ankle, and L Ankle  Treatment Techniques Used : Diversified CMT, Pelvic drop table technique, and Manual traction  Ischemic compression    Soft-tissue mobilization was performed in the following areas:       Lumbar Paraspinal mm. bilateral and Quadratus Lumborum bilateral      The patient tolerated today's treatment with little or no additional discomfort and was instructed to contact the office for questions or concerns.

## 2023-12-01 ENCOUNTER — TELEPHONE (OUTPATIENT)
Dept: PRIMARY CARE | Facility: CLINIC | Age: 68
End: 2023-12-01
Payer: MEDICARE

## 2023-12-01 NOTE — TELEPHONE ENCOUNTER
Yana  Carepined[facility]ordy left a voicemail  message saying she's calling to see if you have updated contact information on Roselyn Jb.   I called Yana back at [phone] no answer I left a voicemail message on Yana secure voicemail line with the two numbers  that we have listed in Roselyn Muhammad chart.

## 2023-12-05 DIAGNOSIS — M32.9 SYSTEMIC LUPUS ERYTHEMATOSUS, UNSPECIFIED SLE TYPE, UNSPECIFIED ORGAN INVOLVEMENT STATUS (MULTI): ICD-10-CM

## 2023-12-05 DIAGNOSIS — Z79.891 ENCOUNTER FOR LONG-TERM USE OF OPIATE ANALGESIC: ICD-10-CM

## 2023-12-05 RX ORDER — TRAMADOL HYDROCHLORIDE 50 MG/1
50 TABLET ORAL 2 TIMES DAILY
Qty: 60 TABLET | Refills: 0 | Status: SHIPPED | OUTPATIENT
Start: 2023-12-05 | End: 2023-12-14 | Stop reason: ALTCHOICE

## 2023-12-05 NOTE — TELEPHONE ENCOUNTER
Pt called for medication refill tramadol 50 mg BID sent to pharmacy, pt is in need to complete a drug screen. Pt has been notified.

## 2023-12-08 ENCOUNTER — LAB (OUTPATIENT)
Dept: LAB | Facility: LAB | Age: 68
End: 2023-12-08
Payer: MEDICARE

## 2023-12-08 DIAGNOSIS — Z79.891 ENCOUNTER FOR LONG-TERM USE OF OPIATE ANALGESIC: ICD-10-CM

## 2023-12-08 LAB
AMPHETAMINES UR QL SCN: ABNORMAL
BARBITURATES UR QL SCN: ABNORMAL
BENZODIAZ UR QL SCN: ABNORMAL
BZE UR QL SCN: ABNORMAL
CANNABINOIDS UR QL SCN: ABNORMAL
FENTANYL+NORFENTANYL UR QL SCN: ABNORMAL
OPIATES UR QL SCN: ABNORMAL
OXYCODONE+OXYMORPHONE UR QL SCN: ABNORMAL
PCP UR QL SCN: ABNORMAL

## 2023-12-08 PROCEDURE — 80307 DRUG TEST PRSMV CHEM ANLYZR: CPT

## 2023-12-08 PROCEDURE — 80324 DRUG SCREEN AMPHETAMINES 1/2: CPT

## 2023-12-14 ENCOUNTER — OFFICE VISIT (OUTPATIENT)
Dept: CARDIOLOGY | Facility: CLINIC | Age: 68
End: 2023-12-14
Payer: MEDICARE

## 2023-12-14 VITALS
DIASTOLIC BLOOD PRESSURE: 70 MMHG | WEIGHT: 154 LBS | OXYGEN SATURATION: 100 % | SYSTOLIC BLOOD PRESSURE: 134 MMHG | HEIGHT: 62 IN | BODY MASS INDEX: 28.34 KG/M2 | HEART RATE: 92 BPM

## 2023-12-14 DIAGNOSIS — I10 BENIGN ESSENTIAL HYPERTENSION: Primary | ICD-10-CM

## 2023-12-14 DIAGNOSIS — R42 DIZZINESS: ICD-10-CM

## 2023-12-14 DIAGNOSIS — R06.02 SOB (SHORTNESS OF BREATH) ON EXERTION: ICD-10-CM

## 2023-12-14 LAB
AMPHET UR-MCNC: <50 NG/ML
MDA UR-MCNC: <200 NG/ML
MDEA UR-MCNC: <200 NG/ML
MDMA UR-MCNC: <200 NG/ML
METHAMPHET UR-MCNC: <200 NG/ML
PHENTERMINE UR CFM-MCNC: <200 NG/ML

## 2023-12-14 PROCEDURE — 1036F TOBACCO NON-USER: CPT | Performed by: INTERNAL MEDICINE

## 2023-12-14 PROCEDURE — 3078F DIAST BP <80 MM HG: CPT | Performed by: INTERNAL MEDICINE

## 2023-12-14 PROCEDURE — 99204 OFFICE O/P NEW MOD 45 MIN: CPT | Performed by: INTERNAL MEDICINE

## 2023-12-14 PROCEDURE — 93005 ELECTROCARDIOGRAM TRACING: CPT | Performed by: INTERNAL MEDICINE

## 2023-12-14 PROCEDURE — 93010 ELECTROCARDIOGRAM REPORT: CPT | Performed by: INTERNAL MEDICINE

## 2023-12-14 PROCEDURE — 99214 OFFICE O/P EST MOD 30 MIN: CPT | Performed by: INTERNAL MEDICINE

## 2023-12-14 PROCEDURE — 1160F RVW MEDS BY RX/DR IN RCRD: CPT | Performed by: INTERNAL MEDICINE

## 2023-12-14 PROCEDURE — 1159F MED LIST DOCD IN RCRD: CPT | Performed by: INTERNAL MEDICINE

## 2023-12-14 PROCEDURE — 3075F SYST BP GE 130 - 139MM HG: CPT | Performed by: INTERNAL MEDICINE

## 2023-12-14 PROCEDURE — 1126F AMNT PAIN NOTED NONE PRSNT: CPT | Performed by: INTERNAL MEDICINE

## 2023-12-14 RX ORDER — DIPHENHYDRAMINE HYDROCHLORIDE 12.5 MG/5ML
LIQUID ORAL
COMMUNITY
Start: 2007-02-27

## 2023-12-14 RX ORDER — ACETAMINOPHEN 325 MG/1
650 TABLET ORAL EVERY 6 HOURS PRN
COMMUNITY
Start: 2021-08-27

## 2023-12-14 RX ORDER — MULTIVITAMIN
TABLET ORAL
COMMUNITY
Start: 2007-03-19

## 2023-12-14 ASSESSMENT — ENCOUNTER SYMPTOMS
OCCASIONAL FEELINGS OF UNSTEADINESS: 0
DEPRESSION: 0
LOSS OF SENSATION IN FEET: 0

## 2023-12-14 ASSESSMENT — PAIN SCALES - GENERAL: PAINLEVEL: 0-NO PAIN

## 2023-12-14 NOTE — PROGRESS NOTES
Referred by Dr. Theodore ref. provider found for New Patient Visit     History Of Present Illness:    Roselyn Muhammad is a 68 y.o. female presenting to establish care fpollowing a hospitalization in September for Syncope in the setting of severe dehydration, Acute Renal Insuffienccy.  She was notably orthosatatic and required 3L fluid resusciation.  Her lisinopril was held.  She ultimately had recovery of kidney function and felt dramatically improved after volume resuscitation.  She was discharged with a 30-day event monitor and directed to follow-up with cardiology.  Since her admission, she has not had any cardiovascular symptoms.  She remains off of her lisinopril.  She offers no cardiovascular complaints    Patient denies chest pain and angina.  Pt denies shortness of breath, dyspnea on exertion, orthopnea, and paroxysmal nocturnal dyspnea.  Pt denies worsening lower extremity edema.  Pt denies palpitations or syncope.  No recent falls.  No fever or chills.  No cough.  No change in bowel or bladder habits.  No travel.  No sick contacts.  No recent travel    12 point review of systems was performed and is otherwise negative.  Past medical history:  As above.  Medications:  Reviewed.  Allergies:  Reviewed.  Social history:  Patient denies smoking, alcohol abuse, or illicit drug use.  Family history:  No sudden cardiac death or premature coronary artery disease.        Past Medical History:  She has a past medical history of Acute vaginitis, Gangrene, not elsewhere classified (CMS/HCC), Other conditions influencing health status, Other conditions influencing health status, Other specified acute skin changes due to ultraviolet radiation, Other specified noninflammatory disorders of vagina (04/06/2015), Personal history of gestational diabetes, Personal history of other diseases of the musculoskeletal system and connective tissue (01/30/2018), Personal history of other diseases of the musculoskeletal system and connective  tissue, Personal history of other endocrine, nutritional and metabolic disease (12/29/2014), Personal history of other infectious and parasitic diseases, and Personal history of other medical treatment.    Past Surgical History:  She has a past surgical history that includes Retinal detachment surgery (02/14/2014); Tubal ligation (02/14/2014); Knee surgery (02/14/2014); Femur fracture surgery (02/14/2014); Other surgical history (02/14/2014); Cataract extraction (02/14/2014); Appendectomy (02/14/2014); Total knee arthroplasty (08/23/2018); and Ankle surgery (05/08/2014).      Social History:  She reports that she has never smoked. She has never used smokeless tobacco. She reports that she does not currently use alcohol. She reports that she does not use drugs.    Family History:  No family history on file.     Allergies:  Citrus and derivatives, Nafcillin, Pregabalin, Sulfa (sulfonamide antibiotics), and Sulfamethoxazole-trimethoprim    Outpatient Medications:  Current Outpatient Medications   Medication Instructions    acetaminophen (TYLENOL) 650 mg, oral, Every 6 hours PRN    buPROPion SR (WELLBUTRIN SR) 150 mg, oral, 2 times daily    buPROPion SR (WELLBUTRIN SR) 100 mg, oral, Daily, Do not crush, chew, or split. Take in afternoon    calcium carbonate 600 mg calcium (1,500 mg) tablet 1 tablet, oral, 2 times daily    CLOBETASOL PROPIONATE, BULK, MISC 0.05 %, Topical, 2 times daily    colchicine 0.6 mg, oral, Daily    diclofenac sodium 1 % kit transdermal, Daily    diphenhydrAMINE 12.5 mg/5 mL liquid oral    DULoxetine (Cymbalta) 60 mg DR capsule 1 capsule, oral, 2 times daily    estradiol (ESTRACE) 0.5 mg, oral, Daily    famotidine (PEPCID) 40 mg, oral, Nightly    folic acid (Folvite) 1 mg tablet oral    lisinopril 40 mg, oral, Daily    methotrexate (Trexall) 2.5 mg tablet Take 6 daily    mirtazapine (Remeron) 7.5 mg tablet 0.5 tablets, oral, Nightly PRN    multivitamin tablet oral    multivitamin with minerals  "(multivit-min-iron fum-folic ac) tablet 1 tablet, oral, Daily    omega-3 fatty acids-fish oil (Fish OiL) 340-1,000 mg capsule oral    traMADol (ULTRAM) 50 mg, oral, 2 times daily    vit C,G-Vh-ffvps-lutein-zeaxan (PreserVision AREDS-2) 250-90-40-1 mg capsule oral, 2 times daily    vitamin B complex (B Complex-Vitamin B12) tablet 1 tablet, oral, Daily        Last Recorded Vitals:  Vitals:    12/14/23 1121   BP: 134/70   Patient Position: Sitting   Pulse: 92   SpO2: 100%   Weight: 69.9 kg (154 lb)   Height: 1.575 m (5' 2\")       Physical Exam:  Vitals and nursing note reviewed.   Constitutional:       Appearance: Healthy appearance. Not in distress.   Eyes:      Pupils: Pupils are equal, round, and reactive to light.   Neck:      Vascular: JVD normal.   Pulmonary:      Effort: Pulmonary effort is normal.      Breath sounds: Normal breath sounds. No wheezing. No rhonchi. No rales.   Chest:      Chest wall: Not tender to palpatation.   Cardiovascular:      Normal rate. Regular rhythm. Normal S1. Normal S2.       Murmurs: There is a grade 1/6 systolic murmur. Soft LVOT Flow Murmur    Pulses:     Intact distal pulses.   Edema:     Peripheral edema absent.   Abdominal:      General: Bowel sounds are normal. There is no distension.      Palpations: Abdomen is soft. There is no abdominal mass.   Musculoskeletal: Normal range of motion.      Cervical back: Normal range of motion. Skin:     General: Skin is warm and dry.   Neurological:      Mental Status: Alert and oriented to person, place and time.              Last Labs:  CBC -  Lab Results   Component Value Date    WBC 6.0 09/21/2023    HGB 11.4 (L) 09/21/2023    HCT 32.0 (L) 09/21/2023    MCV 93 09/21/2023     09/21/2023       CMP -  Lab Results   Component Value Date    CALCIUM 8.3 (L) 09/21/2023    PHOS 3.6 09/21/2023    PROT 6.9 09/20/2023    ALBUMIN 3.0 (L) 09/21/2023    AST 48 (H) 09/20/2023    ALT 33 09/20/2023    ALKPHOS 65 09/20/2023    BILITOT 0.4 " 09/20/2023       LIPID PANEL -   Lab Results   Component Value Date    CHOL 131 11/21/2019    TRIG 83 11/21/2019    HDL 46.4 11/21/2019    CHHDL 2.8 11/21/2019    LDLF 68 11/21/2019    VLDL 17 11/21/2019       RENAL FUNCTION PANEL -   Lab Results   Component Value Date    GLUCOSE 79 09/21/2023     09/21/2023    K 4.1 09/21/2023     (H) 09/21/2023    CO2 21 09/21/2023    ANIONGAP 13 09/21/2023    BUN 15 09/21/2023    CREATININE 1.10 (H) 09/21/2023    CALCIUM 8.3 (L) 09/21/2023    PHOS 3.6 09/21/2023    ALBUMIN 3.0 (L) 09/21/2023        Lab Results   Component Value Date    BNP 54 01/15/2022       Last Cardiology Tests:  ECG:  In Office EKG Sinus Rhythm at 92 BPM -- Mechanical Artifact noted.   Echo:  1. Left ventricular systolic function is hyperdynamic with a 70-75% estimated ejection fraction.  2. Spectral Doppler shows an impaired relaxation pattern of left ventricular diastolic filling.  3. Compared with the prior exam from 2/22/2017 ( [facility]) there are no significant changes.     Echocardiogram 02/2017   1. The left ventricular systolic function is hyperdynamic with a 70-75% estimated ejection fraction.   2. Increased LV mass.   3. The left atrium is normal in size.         Cardiac Imaging:  No results found for this or any previous visit from the past 1095 days.        Lab review: I have personally reviewed the laboratory result(s)   Diagnostic review: I have personally reviewed the result(s) of the EKG, Echocardiogram, and Holter Monitor .     Assessment/Plan     Problem List Items Addressed This Visit       Benign essential hypertension - Primary    Relevant Orders    ECG 12 lead (Clinic Performed) (Completed)    Dizziness    Relevant Orders    ECG 12 lead (Clinic Performed) (Completed)    SOB (shortness of breath) on exertion    Relevant Orders    ECG 12 lead (Clinic Performed) (Completed)     All symptoms have resolved he continues creation of appropriate hydration and  holding off of her ACE inhibitor's.  30-day event monitor was reviewed, no significant arrhythmia burden, no pauses  Remains off lisinopril at this time with an appropriate blood pressure response  Continue to hold, follow-up with PCP    Follow-up with cardiology as needed        Beka Remy, DO

## 2023-12-14 NOTE — PATIENT INSTRUCTIONS
"It was a pleasure seeing you today.     Today, we talked about your hospitalization in September.     Your event monitor did not show any concerning rhythms regarding your heart beats.  This was normal     Your echocardiogram was notable for being  Hyperdynamic ( Squeezing above normal) which fits with your history of being dehydrated at the time of the study.     Continue to work on remaining well hydrated.     Below are some Heart Health Tips that we provide to all of our patients. I hope you fing them useful.     - If you are having problems with medications, consider looking at the following websites.   --  \"GoodRx\"   --  \"[facility]\"      - We are happy to supply written prescriptions if needed to allow you to obtain your medications from different pharmacies. Additionally, if you are having issues with mail order delivery, please let us know. We can send a limited supply of your medications to your local pharmacy.     -  We recommend you follow a heart healthy diet. Watch food labels and try not to eat more than 2,500 mg of sodium per day. Avoid foods high in salt like processed meats (lunch meats, saleh, and sausage), processed foods (boxed dinners, canned soups), fried and fast foods. Monitor serving sizes and if the sodium per serving size is more than 200 mg, avoid those foods. If the sodium per serving size is between 100-200 mg, you can use those in limited quantities. Try to choose foods where the amount of sodium per serving size is less than 100 mg. Try to eat a diet rich in fruits and vegetables, whole grains, low fat dairy products, skinless poultry and fish, nuts, beans, non-tropical vegetable oils. Limit saturated fat, trans fat, sodium, red meats, and sugar-sweetened beverages.   Limit alcohol     -The combination of a reduced-calorie diet and increased physical activity is recommended. Adults should aim to get at least 150 minutes of moderate physical activity per week (30 " minutes of moderate physical activities at least 5 days per week). Examples of moderate physical activities include brisk walking, swimming, aerobic dancing, heavy gardening, jumping rope, bicycling 10 MPH or faster, tennis, hiking uphill or with a heavy backpack. Please let us know if you would like to learn more about your nutrition and calories and additional options including weight loss programs to help you reach your goal.     -If you smoke, stop smoking. If you stop smoking you can help get rid of a major source of stress to your heart. Smoking makes your heart rate and blood pressure go up and increases your risk or developing cardiovascular diseases and worsen symptoms associated with heart failure.     -Obtain a BP monitor and monitor your BP daily. Check it around the same time each day; at least 1 hour after taking your medications. Record your BP in a log and bring your log with you to your doctors appointment.     -F/u with your PCP as recommended.

## 2023-12-15 ENCOUNTER — APPOINTMENT (OUTPATIENT)
Dept: BEHAVIORAL HEALTH | Facility: CLINIC | Age: 68
End: 2023-12-15
Payer: MEDICARE

## 2023-12-17 LAB
ATRIAL RATE: 92 BPM
P AXIS: 63 DEGREES
P OFFSET: 175 MS
P ONSET: 119 MS
PR INTERVAL: 194 MS
Q ONSET: 216 MS
QRS COUNT: 15 BEATS
QRS DURATION: 76 MS
QT INTERVAL: 350 MS
QTC CALCULATION(BAZETT): 432 MS
QTC FREDERICIA: 403 MS
R AXIS: 77 DEGREES
T AXIS: 39 DEGREES
T OFFSET: 391 MS
VENTRICULAR RATE: 92 BPM

## 2023-12-20 ENCOUNTER — APPOINTMENT (OUTPATIENT)
Dept: PRIMARY CARE | Facility: CLINIC | Age: 68
End: 2023-12-20
Payer: MEDICARE

## 2024-01-01 DIAGNOSIS — F32.A DEPRESSION, UNSPECIFIED DEPRESSION TYPE: Primary | ICD-10-CM

## 2024-01-01 RX ORDER — BUPROPION HYDROCHLORIDE 150 MG/1
TABLET, EXTENDED RELEASE ORAL
Qty: 90 TABLET | Refills: 0 | Status: SHIPPED | OUTPATIENT
Start: 2024-01-01 | End: 2024-02-16 | Stop reason: SDUPTHER

## 2024-01-03 ENCOUNTER — APPOINTMENT (OUTPATIENT)
Dept: RADIOLOGY | Facility: CLINIC | Age: 69
End: 2024-01-03
Payer: MEDICARE

## 2024-01-03 DIAGNOSIS — M87.051 AVASCULAR NECROSIS OF BONE OF RIGHT HIP (MULTI): Primary | ICD-10-CM

## 2024-01-03 RX ORDER — TRAMADOL HYDROCHLORIDE 50 MG/1
50 TABLET ORAL 2 TIMES DAILY
Qty: 60 TABLET | Refills: 0 | Status: SHIPPED | OUTPATIENT
Start: 2024-01-05 | End: 2024-02-06 | Stop reason: SDUPTHER

## 2024-01-10 ENCOUNTER — ALLIED HEALTH (OUTPATIENT)
Dept: INTEGRATIVE MEDICINE | Facility: CLINIC | Age: 69
End: 2024-01-10
Payer: MEDICARE

## 2024-01-10 ENCOUNTER — APPOINTMENT (OUTPATIENT)
Dept: INTEGRATIVE MEDICINE | Facility: CLINIC | Age: 69
End: 2024-01-10
Payer: MEDICARE

## 2024-01-10 DIAGNOSIS — M99.03 SEGMENTAL AND SOMATIC DYSFUNCTION OF LUMBAR REGION: Primary | ICD-10-CM

## 2024-01-10 DIAGNOSIS — M99.04 SEGMENTAL AND SOMATIC DYSFUNCTION OF SACRAL REGION: ICD-10-CM

## 2024-01-10 DIAGNOSIS — M79.10 MYALGIA: ICD-10-CM

## 2024-01-10 DIAGNOSIS — M54.50 LUMBOSACRAL PAIN: ICD-10-CM

## 2024-01-10 DIAGNOSIS — M79.9 POSTURAL STRAIN: ICD-10-CM

## 2024-01-10 DIAGNOSIS — M99.05 SEGMENTAL AND SOMATIC DYSFUNCTION OF PELVIC REGION: ICD-10-CM

## 2024-01-10 DIAGNOSIS — M99.02 SEGMENTAL AND SOMATIC DYSFUNCTION OF THORACIC REGION: ICD-10-CM

## 2024-01-10 PROCEDURE — 98941 CHIROPRACT MANJ 3-4 REGIONS: CPT | Performed by: CHIROPRACTOR

## 2024-01-10 NOTE — PROGRESS NOTES
Subjective   Patient ID: Roselyn Muhammad is a 68 y.o. female who presents January 10, 2024 for low back pain.    No limit-Medicare    Today, the patient rates their degree of pain as a 3 out of 10 on the numeric pain rating scale.     Roselyn returns for continued treatment of low back pain. Patient states that she is okay today. She states that she has had occasional low back pain since last visit. She states that symptoms are less severe when they flare up. She states that she has mild mid back discomfort today. Denies any associated symptoms or change in medical history since last visit and will follow up in 2 weeks.           Objective   Physical Exam  Constitutional:       General: She is not in acute distress.     Appearance: Normal appearance.       HENT:      Head: Normocephalic and atraumatic.   Musculoskeletal:      Lumbar back: Tenderness and bony tenderness present. Decreased range of motion.   Neurological:      General: No focal deficit present.      Mental Status: She is alert and oriented to person, place, and time.      Cranial Nerves: No dysarthria or facial asymmetry.      Sensory: Sensation is intact.      Motor: Motor function is intact.      Coordination: Coordination is intact.      Gait: Gait is intact.   Psychiatric:         Attention and Perception: Attention normal.         Mood and Affect: Mood normal.         Speech: Speech normal.         Behavior: Behavior is cooperative.         Palpation of the following region(s) revealed:      Lumbar: Lumbar paraspinals bilateral, hypertonicity and tenderness.  Quadratus lumborum bilateral, hypertonicity and tenderness.        Segmental Joint(s): Segmental joint dysfunction was assessed with motion palpation and is identified in the following areas:  Thoracic : T7 and T8  Lumbopelvic / Sacral SIJ : L4, L5, L5/S1, R SIJ, and L SIJ      Assessment/Plan   Today's Treatment Included: Chiropractic manipulation to the  Segmental Joint(s) Lumbopelvic/Sacral  SIJ : L4, L5, L5/S1, R SIJ, and L SIJ Segmental Joint(s) Thoracic : T7 and T8   Treatment Techniques Used : Diversified CMT, Pelvic drop table technique, and Manual traction  Ischemic compression    Soft-tissue mobilization was performed in the following areas:       Lumbar Paraspinal mm. bilateral and Quadratus Lumborum bilateral      The patient tolerated today's treatment with little or no additional discomfort and was instructed to contact the office for questions or concerns.

## 2024-01-24 ENCOUNTER — APPOINTMENT (OUTPATIENT)
Dept: RADIOLOGY | Facility: CLINIC | Age: 69
End: 2024-01-24
Payer: MEDICARE

## 2024-01-26 ENCOUNTER — APPOINTMENT (OUTPATIENT)
Dept: INTEGRATIVE MEDICINE | Facility: CLINIC | Age: 69
End: 2024-01-26
Payer: MEDICARE

## 2024-01-26 ENCOUNTER — TELEPHONE (OUTPATIENT)
Dept: BEHAVIORAL HEALTH | Facility: CLINIC | Age: 69
End: 2024-01-26

## 2024-01-26 NOTE — TELEPHONE ENCOUNTER
"Spoke with patient. She says that yesterday, she ended taking bupropion SR 150mg and 100mg at the same time instead of separately. She says she felt sleepy and was \"dragging\" afterwards. She says she was still dragging today but drank a protein shake, and is feeling better.     Advised that it should get better with time. Also advised to be careful about taking medications accurately as prescribed.   "

## 2024-01-30 ENCOUNTER — APPOINTMENT (OUTPATIENT)
Dept: RADIOLOGY | Facility: CLINIC | Age: 69
End: 2024-01-30
Payer: MEDICARE

## 2024-01-31 ENCOUNTER — APPOINTMENT (OUTPATIENT)
Dept: RADIOLOGY | Facility: CLINIC | Age: 69
End: 2024-01-31
Payer: MEDICARE

## 2024-02-02 ENCOUNTER — APPOINTMENT (OUTPATIENT)
Dept: INTEGRATIVE MEDICINE | Facility: CLINIC | Age: 69
End: 2024-02-02
Payer: MEDICARE

## 2024-02-02 ENCOUNTER — APPOINTMENT (OUTPATIENT)
Dept: BEHAVIORAL HEALTH | Facility: CLINIC | Age: 69
End: 2024-02-02
Payer: MEDICARE

## 2024-02-02 ENCOUNTER — APPOINTMENT (OUTPATIENT)
Dept: RADIOLOGY | Facility: CLINIC | Age: 69
End: 2024-02-02
Payer: MEDICARE

## 2024-02-06 DIAGNOSIS — M87.051 AVASCULAR NECROSIS OF BONE OF RIGHT HIP (MULTI): ICD-10-CM

## 2024-02-06 RX ORDER — TRAMADOL HYDROCHLORIDE 50 MG/1
50 TABLET ORAL 2 TIMES DAILY
Qty: 60 TABLET | Refills: 0 | Status: SHIPPED | OUTPATIENT
Start: 2024-02-06 | End: 2024-03-08 | Stop reason: SDUPTHER

## 2024-02-06 NOTE — TELEPHONE ENCOUNTER
Pt called for medication refill Tramadol 50 mg BID sent to pharmacy, patient is due for a follow up will call to schedule.

## 2024-02-14 ENCOUNTER — APPOINTMENT (OUTPATIENT)
Dept: RADIOLOGY | Facility: CLINIC | Age: 69
End: 2024-02-14
Payer: MEDICARE

## 2024-02-16 ENCOUNTER — TELEPHONE (OUTPATIENT)
Dept: OTHER | Age: 69
End: 2024-02-16
Payer: MEDICARE

## 2024-02-16 DIAGNOSIS — F32.A DEPRESSION, UNSPECIFIED DEPRESSION TYPE: ICD-10-CM

## 2024-02-16 DIAGNOSIS — F32.89 OTHER DEPRESSION: ICD-10-CM

## 2024-02-16 RX ORDER — BUPROPION HYDROCHLORIDE 100 MG/1
TABLET, EXTENDED RELEASE ORAL
Qty: 90 TABLET | Refills: 1 | Status: SHIPPED | OUTPATIENT
Start: 2024-02-16

## 2024-02-16 RX ORDER — BUPROPION HYDROCHLORIDE 150 MG/1
TABLET, EXTENDED RELEASE ORAL
Qty: 90 TABLET | Refills: 1 | Status: SHIPPED | OUTPATIENT
Start: 2024-02-16

## 2024-02-16 RX ORDER — DULOXETIN HYDROCHLORIDE 60 MG/1
60 CAPSULE, DELAYED RELEASE ORAL 2 TIMES DAILY
Qty: 180 CAPSULE | Refills: 1 | Status: SHIPPED | OUTPATIENT
Start: 2024-02-16

## 2024-02-23 ENCOUNTER — ALLIED HEALTH (OUTPATIENT)
Dept: INTEGRATIVE MEDICINE | Facility: CLINIC | Age: 69
End: 2024-02-23
Payer: MEDICARE

## 2024-02-23 ENCOUNTER — TELEPHONE (OUTPATIENT)
Dept: PRIMARY CARE | Facility: CLINIC | Age: 69
End: 2024-02-23

## 2024-02-23 DIAGNOSIS — M99.03 SEGMENTAL AND SOMATIC DYSFUNCTION OF LUMBAR REGION: Primary | ICD-10-CM

## 2024-02-23 DIAGNOSIS — M79.10 MYALGIA: ICD-10-CM

## 2024-02-23 DIAGNOSIS — M79.9 POSTURAL STRAIN: ICD-10-CM

## 2024-02-23 DIAGNOSIS — M99.04 SEGMENTAL AND SOMATIC DYSFUNCTION OF SACRAL REGION: ICD-10-CM

## 2024-02-23 DIAGNOSIS — M99.05 SEGMENTAL AND SOMATIC DYSFUNCTION OF PELVIC REGION: ICD-10-CM

## 2024-02-23 DIAGNOSIS — M54.50 LUMBOSACRAL PAIN: ICD-10-CM

## 2024-02-23 PROCEDURE — 98941 CHIROPRACT MANJ 3-4 REGIONS: CPT | Performed by: CHIROPRACTOR

## 2024-02-23 NOTE — TELEPHONE ENCOUNTER
Yana Michael  [employer] left a voicemail message saying she's calling to make you aware that they are applying for a waiver services for member if with questions call Yana back at [phone].

## 2024-02-23 NOTE — PROGRESS NOTES
Subjective   Patient ID: Roselyn Muhammad is a 68 y.o. female who presents February 23, 2024 for low back pain.    No limit-Medicare    Today, the patient rates their degree of pain as a 3 out of 10 on the numeric pain rating scale.     Roselyn returns for continued treatment of low back pain. Patient states that she has a flare up of low back pain. She states that symptoms are not severe. She states that she has the most pain while walking and occasionally sitting. She states that she is going to start exercising and walking next week. Denies any associated symptoms or change in medical history since last visit and will follow up in 2 weeks.           Objective   Physical Exam  Constitutional:       General: She is not in acute distress.     Appearance: Normal appearance.       HENT:      Head: Normocephalic and atraumatic.   Musculoskeletal:      Lumbar back: Tenderness and bony tenderness present. Decreased range of motion.   Neurological:      General: No focal deficit present.      Mental Status: She is alert and oriented to person, place, and time.      Cranial Nerves: No dysarthria or facial asymmetry.      Sensory: Sensation is intact.      Motor: Motor function is intact.      Coordination: Coordination is intact.      Gait: Gait is intact.   Psychiatric:         Attention and Perception: Attention normal.         Mood and Affect: Mood normal.         Speech: Speech normal.         Behavior: Behavior is cooperative.         Palpation of the following region(s) revealed:      Lumbar: Lumbar paraspinals bilateral, hypertonicity and tenderness.  Quadratus lumborum bilateral, hypertonicity and tenderness.        Segmental Joint(s): Segmental joint dysfunction was assessed with motion palpation and is identified in the following areas:  Lumbopelvic / Sacral SIJ : L4, L5, L5/S1, R SIJ, and L SIJ      Assessment/Plan   Today's Treatment Included: Chiropractic manipulation to the  Segmental Joint(s) Lumbopelvic/Sacral  SIJ : L4, L5, L5/S1, R SIJ, and L SIJ    Treatment Techniques Used : Diversified CMT, Pelvic drop table technique, and Manual traction  Ischemic compression    Soft-tissue mobilization was performed in the following areas:       Lumbar Paraspinal mm. bilateral and Quadratus Lumborum bilateral      The patient tolerated today's treatment with little or no additional discomfort and was instructed to contact the office for questions or concerns.

## 2024-03-05 DIAGNOSIS — K29.70 GASTRITIS WITHOUT BLEEDING, UNSPECIFIED CHRONICITY, UNSPECIFIED GASTRITIS TYPE: ICD-10-CM

## 2024-03-06 ENCOUNTER — APPOINTMENT (OUTPATIENT)
Dept: RADIOLOGY | Facility: CLINIC | Age: 69
End: 2024-03-06
Payer: MEDICARE

## 2024-03-06 ENCOUNTER — APPOINTMENT (OUTPATIENT)
Dept: RADIOLOGY | Facility: HOSPITAL | Age: 69
End: 2024-03-06
Payer: MEDICARE

## 2024-03-06 RX ORDER — FAMOTIDINE 40 MG/1
40 TABLET, FILM COATED ORAL NIGHTLY
Qty: 90 TABLET | Refills: 0 | Status: SHIPPED | OUTPATIENT
Start: 2024-03-06 | End: 2024-06-06 | Stop reason: SDUPTHER

## 2024-03-08 DIAGNOSIS — M87.051 AVASCULAR NECROSIS OF BONE OF RIGHT HIP (MULTI): ICD-10-CM

## 2024-03-11 RX ORDER — TRAMADOL HYDROCHLORIDE 50 MG/1
50 TABLET ORAL 2 TIMES DAILY
Qty: 60 TABLET | Refills: 0 | Status: SHIPPED | OUTPATIENT
Start: 2024-03-11 | End: 2024-04-09 | Stop reason: SDUPTHER

## 2024-03-14 ENCOUNTER — APPOINTMENT (OUTPATIENT)
Dept: INTEGRATIVE MEDICINE | Facility: CLINIC | Age: 69
End: 2024-03-14
Payer: MEDICARE

## 2024-03-15 ENCOUNTER — DOCUMENTATION (OUTPATIENT)
Dept: BEHAVIORAL HEALTH | Facility: CLINIC | Age: 69
End: 2024-03-15

## 2024-03-15 NOTE — PROGRESS NOTES
Patient was scheduled for follow up visit virtually today at 3pm. Patient did not join meeting virtually by 3:30pm, so meeting was ended. Patient considered no-show.

## 2024-03-20 ENCOUNTER — APPOINTMENT (OUTPATIENT)
Dept: PRIMARY CARE | Facility: CLINIC | Age: 69
End: 2024-03-20
Payer: MEDICARE

## 2024-03-21 ENCOUNTER — TELEMEDICINE (OUTPATIENT)
Dept: PAIN MEDICINE | Facility: CLINIC | Age: 69
End: 2024-03-21
Payer: MEDICARE

## 2024-03-21 ENCOUNTER — APPOINTMENT (OUTPATIENT)
Dept: PAIN MEDICINE | Facility: CLINIC | Age: 69
End: 2024-03-21
Payer: MEDICARE

## 2024-03-21 DIAGNOSIS — M32.19 SYSTEMIC LUPUS ERYTHEMATOSUS WITH OTHER ORGAN INVOLVEMENT, UNSPECIFIED SLE TYPE (MULTI): Primary | ICD-10-CM

## 2024-03-21 PROCEDURE — 99213 OFFICE O/P EST LOW 20 MIN: CPT | Performed by: PAIN MEDICINE

## 2024-03-21 PROCEDURE — 1159F MED LIST DOCD IN RCRD: CPT | Performed by: PAIN MEDICINE

## 2024-03-21 PROCEDURE — 1036F TOBACCO NON-USER: CPT | Performed by: PAIN MEDICINE

## 2024-03-21 NOTE — PROGRESS NOTES
"3/21/2024    Ms. Muhammad had virtual interview today. She reported stable pain control no new compla  Ms. Muhammad is here today as follow up and renew meds contract     HPI     67 y/o female patient with history of low back and left leg pain, systemic lupus, arthritis, fibromyalgia, insomnia, and depression and bilateral shoulder arthritis who presents for a virtual visit.     patient states pain is stable on her current medication regimen. She report that she is having more headache episodes that shoulder or back pain, She report that her pain is doing ok and that she takes tramadol 50mg BID, meloxicam, mirtazapine qHS, duloxetine 60mg. She report that she is only doing some warmup exercises and stretching but that she plans to start on doing home exercises today. She denies any side effects from the tramadol.      Plan      Continue current treatment with tramadol 50 mg twice daily as needed  - Continue home exercises   - Patient has declined physical for now                  Electronically signed by Malika Mena MD at 10/26/2023  1:21 PM         Office Visit on 10/26/2023          Note shared with patient  Additional Documentation    Vitals: /80     Pulse 94     Resp 19     Ht 1.575 m (5' 2\")     Wt 69.4 kg (153 lb)     BMI 27.98 kg/m²     BSA 1.74 m²     Pain Sc   5 (Loc: Back)   Flowsheets: Interfaced Flowsheet Data,     Pain Assessment,     Pain Assessment   Encounter Info: Billing Info,     History,     Allergies     Orders Placed    None  Medication Changes      None  Medication List  Visit Diagnoses      Arthritis    Systemic lupus erythematosus, unspecified SLE type, unspecified organ involvement status (CMS/[facility])  Problem Listin    Plan   Renew her meds   Continue current regimen        "

## 2024-03-28 ENCOUNTER — APPOINTMENT (OUTPATIENT)
Dept: RADIOLOGY | Facility: CLINIC | Age: 69
End: 2024-03-28
Payer: MEDICARE

## 2024-03-29 ENCOUNTER — APPOINTMENT (OUTPATIENT)
Dept: BEHAVIORAL HEALTH | Facility: CLINIC | Age: 69
End: 2024-03-29
Payer: MEDICARE

## 2024-04-09 DIAGNOSIS — M87.051 AVASCULAR NECROSIS OF BONE OF RIGHT HIP (MULTI): ICD-10-CM

## 2024-04-09 RX ORDER — TRAMADOL HYDROCHLORIDE 50 MG/1
50 TABLET ORAL 2 TIMES DAILY
Qty: 60 TABLET | Refills: 0 | Status: SHIPPED | OUTPATIENT
Start: 2024-04-09 | End: 2024-05-09 | Stop reason: SDUPTHER

## 2024-04-09 NOTE — TELEPHONE ENCOUNTER
Pt called for medication refill Tramadol 50 mg BID to be sent to pharmacy oat us due for CSA update and will do that next visit.

## 2024-04-10 ENCOUNTER — APPOINTMENT (OUTPATIENT)
Dept: RADIOLOGY | Facility: CLINIC | Age: 69
End: 2024-04-10
Payer: MEDICARE

## 2024-04-11 ENCOUNTER — APPOINTMENT (OUTPATIENT)
Dept: PRIMARY CARE | Facility: CLINIC | Age: 69
End: 2024-04-11
Payer: MEDICARE

## 2024-04-25 ENCOUNTER — APPOINTMENT (OUTPATIENT)
Dept: INTEGRATIVE MEDICINE | Facility: CLINIC | Age: 69
End: 2024-04-25
Payer: MEDICARE

## 2024-05-02 ENCOUNTER — APPOINTMENT (OUTPATIENT)
Dept: PRIMARY CARE | Facility: CLINIC | Age: 69
End: 2024-05-02
Payer: MEDICARE

## 2024-05-09 DIAGNOSIS — M87.051 AVASCULAR NECROSIS OF BONE OF RIGHT HIP (MULTI): ICD-10-CM

## 2024-05-09 RX ORDER — TRAMADOL HYDROCHLORIDE 50 MG/1
50 TABLET ORAL 2 TIMES DAILY
Qty: 60 TABLET | Refills: 0 | Status: SHIPPED | OUTPATIENT
Start: 2024-05-09 | End: 2024-06-05 | Stop reason: SDUPTHER

## 2024-06-05 DIAGNOSIS — M87.051 AVASCULAR NECROSIS OF BONE OF RIGHT HIP (MULTI): ICD-10-CM

## 2024-06-05 RX ORDER — TRAMADOL HYDROCHLORIDE 50 MG/1
50 TABLET ORAL 2 TIMES DAILY
Qty: 60 TABLET | Refills: 0 | Status: SHIPPED | OUTPATIENT
Start: 2024-06-05 | End: 2024-07-05

## 2024-06-05 NOTE — TELEPHONE ENCOUNTER
Pt called for medication refill Tramadol 50 mg one tablet 2 times daily to be sent to pharmacy. Patient is due for 3 month medication follow up, and will need to update CSA as it has not been return after mailing out one. Will contact patient to schedule appointment.      [FreeTextEntry1] : He has a large thyroid mass, predominantly left-sided.

## 2024-06-06 DIAGNOSIS — K29.70 GASTRITIS WITHOUT BLEEDING, UNSPECIFIED CHRONICITY, UNSPECIFIED GASTRITIS TYPE: ICD-10-CM

## 2024-06-07 RX ORDER — FAMOTIDINE 40 MG/1
40 TABLET, FILM COATED ORAL NIGHTLY
Qty: 90 TABLET | Refills: 1 | Status: SHIPPED | OUTPATIENT
Start: 2024-06-07

## 2024-06-13 ENCOUNTER — APPOINTMENT (OUTPATIENT)
Dept: RADIOLOGY | Facility: CLINIC | Age: 69
End: 2024-06-13
Payer: MEDICARE

## 2024-06-13 DIAGNOSIS — Z12.11 COLON CANCER SCREENING: ICD-10-CM

## 2024-06-13 RX ORDER — POLYETHYLENE GLYCOL 3350, SODIUM CHLORIDE, SODIUM BICARBONATE, POTASSIUM CHLORIDE 420; 11.2; 5.72; 1.48 G/4L; G/4L; G/4L; G/4L
POWDER, FOR SOLUTION ORAL
Qty: 4000 ML | Refills: 0 | Status: SHIPPED | OUTPATIENT
Start: 2024-06-13

## 2024-06-18 ENCOUNTER — APPOINTMENT (OUTPATIENT)
Dept: PRIMARY CARE | Facility: CLINIC | Age: 69
End: 2024-06-18
Payer: MEDICARE

## 2024-06-20 ENCOUNTER — APPOINTMENT (OUTPATIENT)
Dept: PAIN MEDICINE | Facility: CLINIC | Age: 69
End: 2024-06-20
Payer: MEDICARE

## 2024-06-25 ENCOUNTER — APPOINTMENT (OUTPATIENT)
Dept: PRIMARY CARE | Facility: CLINIC | Age: 69
End: 2024-06-25
Payer: MEDICARE

## 2024-06-25 ENCOUNTER — OFFICE VISIT (OUTPATIENT)
Dept: PRIMARY CARE | Facility: CLINIC | Age: 69
End: 2024-06-25
Payer: MEDICARE

## 2024-06-25 VITALS
HEIGHT: 62 IN | RESPIRATION RATE: 22 BRPM | WEIGHT: 170 LBS | BODY MASS INDEX: 31.28 KG/M2 | OXYGEN SATURATION: 99 % | SYSTOLIC BLOOD PRESSURE: 140 MMHG | TEMPERATURE: 97.3 F | DIASTOLIC BLOOD PRESSURE: 74 MMHG | HEART RATE: 95 BPM

## 2024-06-25 DIAGNOSIS — H90.3 SENSORINEURAL HEARING LOSS, BILATERAL: ICD-10-CM

## 2024-06-25 DIAGNOSIS — M32.19 SYSTEMIC LUPUS ERYTHEMATOSUS WITH OTHER ORGAN INVOLVEMENT, UNSPECIFIED SLE TYPE (MULTI): ICD-10-CM

## 2024-06-25 DIAGNOSIS — M54.50 LUMBOSACRAL PAIN: ICD-10-CM

## 2024-06-25 DIAGNOSIS — E66.09 CLASS 1 OBESITY DUE TO EXCESS CALORIES WITH SERIOUS COMORBIDITY AND BODY MASS INDEX (BMI) OF 31.0 TO 31.9 IN ADULT: ICD-10-CM

## 2024-06-25 DIAGNOSIS — I10 BENIGN ESSENTIAL HYPERTENSION: ICD-10-CM

## 2024-06-25 DIAGNOSIS — N18.9 CHRONIC KIDNEY DISEASE, UNSPECIFIED CKD STAGE: ICD-10-CM

## 2024-06-25 DIAGNOSIS — R51.9 NONINTRACTABLE HEADACHE, UNSPECIFIED CHRONICITY PATTERN, UNSPECIFIED HEADACHE TYPE: ICD-10-CM

## 2024-06-25 DIAGNOSIS — M81.8 IDIOPATHIC OSTEOPOROSIS: ICD-10-CM

## 2024-06-25 DIAGNOSIS — M85.80 OSTEOPENIA, UNSPECIFIED LOCATION: ICD-10-CM

## 2024-06-25 DIAGNOSIS — M11.20 CALCIUM PYROPHOSPHATE DEPOSITION DISEASE (CPPD): ICD-10-CM

## 2024-06-25 DIAGNOSIS — Z00.00 MEDICARE ANNUAL WELLNESS VISIT, SUBSEQUENT: Primary | ICD-10-CM

## 2024-06-25 DIAGNOSIS — M79.7 FIBROMYALGIA: ICD-10-CM

## 2024-06-25 DIAGNOSIS — D47.2 MONOCLONAL GAMMOPATHY: ICD-10-CM

## 2024-06-25 DIAGNOSIS — M47.817 LUMBOSACRAL SPONDYLOSIS WITHOUT MYELOPATHY: ICD-10-CM

## 2024-06-25 PROBLEM — E66.811 CLASS 1 OBESITY DUE TO EXCESS CALORIES WITH SERIOUS COMORBIDITY AND BODY MASS INDEX (BMI) OF 31.0 TO 31.9 IN ADULT: Status: ACTIVE | Noted: 2024-06-25

## 2024-06-25 PROBLEM — S09.90XA CLOSED HEAD INJURY: Status: RESOLVED | Noted: 2021-08-26 | Resolved: 2024-06-25

## 2024-06-25 PROBLEM — M99.09 SEGMENTAL AND SOMATIC DYSFUNCTION: Status: RESOLVED | Noted: 2023-07-19 | Resolved: 2024-06-25

## 2024-06-25 PROBLEM — R42 DIZZINESS: Status: RESOLVED | Noted: 2023-09-19 | Resolved: 2024-06-25

## 2024-06-25 PROBLEM — M25.511 SHOULDER PAIN, BILATERAL: Status: RESOLVED | Noted: 2023-07-19 | Resolved: 2024-06-25

## 2024-06-25 PROBLEM — S37.009A INJURY OF KIDNEY: Status: RESOLVED | Noted: 2023-10-05 | Resolved: 2024-06-25

## 2024-06-25 PROBLEM — R09.02 HYPOXEMIA: Status: RESOLVED | Noted: 2023-10-05 | Resolved: 2024-06-25

## 2024-06-25 PROBLEM — R06.09 EXERTIONAL DYSPNEA: Status: RESOLVED | Noted: 2023-10-05 | Resolved: 2024-06-25

## 2024-06-25 PROBLEM — M87.059 AVASCULAR NECROSIS OF HIP (MULTI): Status: RESOLVED | Noted: 2023-07-19 | Resolved: 2024-06-25

## 2024-06-25 PROBLEM — M25.512 SHOULDER PAIN, BILATERAL: Status: RESOLVED | Noted: 2023-07-19 | Resolved: 2024-06-25

## 2024-06-25 PROCEDURE — 1126F AMNT PAIN NOTED NONE PRSNT: CPT | Performed by: PEDIATRICS

## 2024-06-25 PROCEDURE — 90677 PCV20 VACCINE IM: CPT | Performed by: PEDIATRICS

## 2024-06-25 PROCEDURE — 99214 OFFICE O/P EST MOD 30 MIN: CPT | Performed by: PEDIATRICS

## 2024-06-25 PROCEDURE — 1170F FXNL STATUS ASSESSED: CPT | Performed by: PEDIATRICS

## 2024-06-25 PROCEDURE — 3077F SYST BP >= 140 MM HG: CPT | Performed by: PEDIATRICS

## 2024-06-25 PROCEDURE — 1160F RVW MEDS BY RX/DR IN RCRD: CPT | Performed by: PEDIATRICS

## 2024-06-25 PROCEDURE — 3078F DIAST BP <80 MM HG: CPT | Performed by: PEDIATRICS

## 2024-06-25 PROCEDURE — 1124F ACP DISCUSS-NO DSCNMKR DOCD: CPT | Performed by: PEDIATRICS

## 2024-06-25 PROCEDURE — G0439 PPPS, SUBSEQ VISIT: HCPCS | Performed by: PEDIATRICS

## 2024-06-25 PROCEDURE — 3008F BODY MASS INDEX DOCD: CPT | Performed by: PEDIATRICS

## 2024-06-25 PROCEDURE — 1159F MED LIST DOCD IN RCRD: CPT | Performed by: PEDIATRICS

## 2024-06-25 PROCEDURE — G0009 ADMIN PNEUMOCOCCAL VACCINE: HCPCS | Performed by: PEDIATRICS

## 2024-06-25 ASSESSMENT — ENCOUNTER SYMPTOMS
DEPRESSION: 0
OCCASIONAL FEELINGS OF UNSTEADINESS: 1
LOSS OF SENSATION IN FEET: 0
HYPERTENSION: 1

## 2024-06-25 ASSESSMENT — ACTIVITIES OF DAILY LIVING (ADL)
DOING_HOUSEWORK: NEEDS ASSISTANCE
TAKING_MEDICATION: INDEPENDENT
GROCERY_SHOPPING: NEEDS ASSISTANCE
DRESSING: INDEPENDENT
MANAGING_FINANCES: INDEPENDENT
BATHING: INDEPENDENT

## 2024-06-25 ASSESSMENT — PATIENT HEALTH QUESTIONNAIRE - PHQ9
2. FEELING DOWN, DEPRESSED OR HOPELESS: NOT AT ALL
1. LITTLE INTEREST OR PLEASURE IN DOING THINGS: NOT AT ALL
SUM OF ALL RESPONSES TO PHQ9 QUESTIONS 1 AND 2: 0

## 2024-06-25 ASSESSMENT — PAIN SCALES - GENERAL: PAINLEVEL: 0-NO PAIN

## 2024-06-25 NOTE — PATIENT INSTRUCTIONS
Avoid cheese; try peanut butter powder  See Dr Little of rheumatology   See hematologist  See Dr Kaur  See me in 6 months

## 2024-06-25 NOTE — PROGRESS NOTES
"Subjective   Reason for Visit: Roselyn Muhammad is an 68 y.o. female here for a Medicare Wellness visit.          Reviewed all medications by prescribing practitioner or clinical pharmacist (such as prescriptions, OTCs, herbal therapies and supplements) and documented in the medical record.    Hypertension      Dexa, mammogram and colonoscopy have been scheduled;   Has not seen rheum or hematologist for a while.    Patient Care Team:  Cecily Jolley MD as PCP - General (Pediatrics)  Cecily Jolley MD as PCP - [employer] PCP  Cecily Jolley MD as PCP - [facility] PCP     Review of Systems  Occ left buttock pain resolved with diclofenac  Objective   Vitals:  /74 (BP Location: Left arm, Patient Position: Sitting, BP Cuff Size: Adult)   Pulse 95   Temp 36.3 °C (97.3 °F) (Temporal)   Resp 22   Ht 1.575 m (5' 2\")   Wt 77.1 kg (170 lb)   SpO2 99%   BMI 31.09 kg/m²       Physical Exam  Vitals reviewed.   Constitutional:       General: She is not in acute distress.  HENT:      Head: Normocephalic.      Right Ear: Tympanic membrane normal.      Left Ear: Tympanic membrane normal.      Nose: Nose normal.      Mouth/Throat:      Pharynx: Oropharynx is clear.   Cardiovascular:      Rate and Rhythm: Normal rate and regular rhythm.      Heart sounds: Normal heart sounds.   Pulmonary:      Breath sounds: Normal breath sounds.   Abdominal:      Palpations: Abdomen is soft.      Tenderness: There is no abdominal tenderness.   Musculoskeletal:         General: No tenderness.   Skin:     Findings: No rash.   Neurological:      General: No focal deficit present.      Mental Status: She is alert.   Psychiatric:         Mood and Affect: Mood normal.         Assessment/Plan   Problem List Items Addressed This Visit    None    Problem List Items Addressed This Visit       Benign essential hypertension    Current Assessment & Plan     Does not check BP at home         Calcium pyrophosphate deposition disease (CPPD) "    CKD (chronic kidney disease)    Current Assessment & Plan     Has not seen Dr Kaur for a while         Relevant Orders    Lipid panel    Basic metabolic panel    Fibromyalgia    Lumbosacral pain    Monoclonal gammopathy    Relevant Orders    Referral to Hematology and Oncology    Serum Protein Electrophoresis    Sensorineural hearing loss, bilateral    Systemic lupus erythematosus (Multi)    RESOLVED: Idiopathic osteoporosis    Lumbosacral spondylosis without myelopathy    Current Assessment & Plan     Dr Mena gives her tramadol         Headache    Current Assessment & Plan     Tramadol give her a headache but helps her back         Osteopenia    Class 1 obesity due to excess calories with serious comorbidity and body mass index (BMI) of 31.0 to 31.9 in adult     Other Visit Diagnoses       Medicare annual wellness visit, subsequent    -  Primary

## 2024-07-05 DIAGNOSIS — M87.051 AVASCULAR NECROSIS OF BONE OF RIGHT HIP (MULTI): ICD-10-CM

## 2024-07-05 RX ORDER — TRAMADOL HYDROCHLORIDE 50 MG/1
50 TABLET ORAL 2 TIMES DAILY
Qty: 60 TABLET | Refills: 0 | Status: SHIPPED | OUTPATIENT
Start: 2024-07-05 | End: 2024-08-04

## 2024-07-10 ENCOUNTER — APPOINTMENT (OUTPATIENT)
Dept: RADIOLOGY | Facility: CLINIC | Age: 69
End: 2024-07-10
Payer: MEDICARE

## 2024-07-11 ENCOUNTER — APPOINTMENT (OUTPATIENT)
Dept: PAIN MEDICINE | Facility: CLINIC | Age: 69
End: 2024-07-11
Payer: MEDICARE

## 2024-07-16 ENCOUNTER — TELEPHONE (OUTPATIENT)
Dept: BEHAVIORAL HEALTH | Facility: CLINIC | Age: 69
End: 2024-07-16
Payer: MEDICARE

## 2024-07-17 ENCOUNTER — TELEPHONE (OUTPATIENT)
Dept: PRIMARY CARE | Facility: CLINIC | Age: 69
End: 2024-07-17
Payer: MEDICARE

## 2024-07-17 DIAGNOSIS — F32.89 OTHER DEPRESSION: ICD-10-CM

## 2024-07-17 RX ORDER — MIRTAZAPINE 7.5 MG/1
TABLET, FILM COATED ORAL
Qty: 30 TABLET | Refills: 1 | Status: SHIPPED | OUTPATIENT
Start: 2024-07-17

## 2024-07-17 NOTE — PROGRESS NOTES
Spoke with patient - says her mother  last week. Has been having trouble sleeping.     She was taking mirtazapine before but does not know why she stopped. Will prescribe mirtazapine 7.5mg half to one tab at bedtime. Has follow up scheduled.

## 2024-07-17 NOTE — TELEPHONE ENCOUNTER
Yana from [facility] wanted to make you aware that the pt will be switching over to a waiver program for insurance.

## 2024-07-18 ENCOUNTER — APPOINTMENT (OUTPATIENT)
Dept: RADIOLOGY | Facility: CLINIC | Age: 69
End: 2024-07-18
Payer: MEDICARE

## 2024-07-25 ENCOUNTER — APPOINTMENT (OUTPATIENT)
Dept: PAIN MEDICINE | Facility: CLINIC | Age: 69
End: 2024-07-25
Payer: MEDICARE

## 2024-08-05 DIAGNOSIS — M87.051 AVASCULAR NECROSIS OF BONE OF RIGHT HIP (MULTI): ICD-10-CM

## 2024-08-05 RX ORDER — TRAMADOL HYDROCHLORIDE 50 MG/1
50 TABLET ORAL 2 TIMES DAILY
Qty: 60 TABLET | Refills: 0 | Status: SHIPPED | OUTPATIENT
Start: 2024-08-05 | End: 2024-09-04

## 2024-08-05 NOTE — TELEPHONE ENCOUNTER
Pt called for medication refill Tramadol 50 mg BID to be filled to preferred pharmacy. Patient has missed several follow up, and needs to update CSA. Patient will need to keep her appointment for any refills moving forward.

## 2024-08-08 ENCOUNTER — APPOINTMENT (OUTPATIENT)
Dept: PAIN MEDICINE | Facility: CLINIC | Age: 69
End: 2024-08-08
Payer: MEDICARE

## 2024-08-16 ENCOUNTER — APPOINTMENT (OUTPATIENT)
Dept: RADIOLOGY | Facility: HOSPITAL | Age: 69
End: 2024-08-16
Payer: MEDICARE

## 2024-08-20 ENCOUNTER — APPOINTMENT (OUTPATIENT)
Dept: GASTROENTEROLOGY | Facility: EXTERNAL LOCATION | Age: 69
End: 2024-08-20
Payer: MEDICARE

## 2024-08-22 ENCOUNTER — APPOINTMENT (OUTPATIENT)
Dept: PAIN MEDICINE | Facility: CLINIC | Age: 69
End: 2024-08-22
Payer: MEDICARE

## 2024-08-22 DIAGNOSIS — F32.A DEPRESSION, UNSPECIFIED DEPRESSION TYPE: ICD-10-CM

## 2024-08-22 DIAGNOSIS — F32.89 OTHER DEPRESSION: ICD-10-CM

## 2024-08-22 RX ORDER — BUPROPION HYDROCHLORIDE 150 MG/1
TABLET, EXTENDED RELEASE ORAL
Qty: 90 TABLET | Refills: 1 | Status: SHIPPED | OUTPATIENT
Start: 2024-08-22 | End: 2024-08-23 | Stop reason: SDUPTHER

## 2024-08-22 RX ORDER — BUPROPION HYDROCHLORIDE 100 MG/1
TABLET, EXTENDED RELEASE ORAL
Qty: 90 TABLET | Refills: 1 | Status: SHIPPED | OUTPATIENT
Start: 2024-08-22 | End: 2024-08-23 | Stop reason: SDUPTHER

## 2024-08-22 RX ORDER — DULOXETIN HYDROCHLORIDE 60 MG/1
60 CAPSULE, DELAYED RELEASE ORAL 2 TIMES DAILY
Qty: 180 CAPSULE | Refills: 1 | Status: SHIPPED | OUTPATIENT
Start: 2024-08-22 | End: 2024-08-23 | Stop reason: SDUPTHER

## 2024-08-23 ENCOUNTER — APPOINTMENT (OUTPATIENT)
Dept: RADIOLOGY | Facility: CLINIC | Age: 69
End: 2024-08-23
Payer: MEDICARE

## 2024-08-23 DIAGNOSIS — F32.A DEPRESSION, UNSPECIFIED DEPRESSION TYPE: ICD-10-CM

## 2024-08-23 DIAGNOSIS — F32.89 OTHER DEPRESSION: ICD-10-CM

## 2024-08-23 RX ORDER — BUPROPION HYDROCHLORIDE 100 MG/1
TABLET, EXTENDED RELEASE ORAL
Qty: 90 TABLET | Refills: 1 | Status: SHIPPED | OUTPATIENT
Start: 2024-08-23

## 2024-08-23 RX ORDER — BUPROPION HYDROCHLORIDE 150 MG/1
TABLET, EXTENDED RELEASE ORAL
Qty: 90 TABLET | Refills: 1 | Status: SHIPPED | OUTPATIENT
Start: 2024-08-23

## 2024-08-23 RX ORDER — DULOXETIN HYDROCHLORIDE 60 MG/1
60 CAPSULE, DELAYED RELEASE ORAL 2 TIMES DAILY
Qty: 180 CAPSULE | Refills: 1 | Status: SHIPPED | OUTPATIENT
Start: 2024-08-23

## 2024-09-04 ENCOUNTER — TELEPHONE (OUTPATIENT)
Dept: OPERATING ROOM | Facility: CLINIC | Age: 69
End: 2024-09-04
Payer: MEDICARE

## 2024-09-04 NOTE — TELEPHONE ENCOUNTER
Pt called requesting for medication refill, pt is over due for 3 month medication follow up, and needs to update her contract, which had been mailed out already once and never received back. Pt was informed last medication refill that we would not refill her medication until she come to her follow up. Pt had no showed and cancelled several of her appointments. She is currently schedule for follow up this week she will need to come to that appointment before we can refill anymore.

## 2024-09-05 ENCOUNTER — APPOINTMENT (OUTPATIENT)
Dept: PAIN MEDICINE | Facility: CLINIC | Age: 69
End: 2024-09-05
Payer: MEDICARE

## 2024-09-05 DIAGNOSIS — Z51.81 ENCOUNTER FOR THERAPEUTIC DRUG LEVEL MONITORING: ICD-10-CM

## 2024-09-05 DIAGNOSIS — M87.051 AVASCULAR NECROSIS OF BONE OF RIGHT HIP (MULTI): ICD-10-CM

## 2024-09-05 DIAGNOSIS — Z02.83 ENCOUNTER FOR DRUG SCREENING: ICD-10-CM

## 2024-09-05 PROCEDURE — 99213 OFFICE O/P EST LOW 20 MIN: CPT | Performed by: PAIN MEDICINE

## 2024-09-05 PROCEDURE — 1125F AMNT PAIN NOTED PAIN PRSNT: CPT | Performed by: PAIN MEDICINE

## 2024-09-05 RX ORDER — TRAMADOL HYDROCHLORIDE 50 MG/1
50 TABLET ORAL 2 TIMES DAILY
Qty: 60 TABLET | Refills: 0 | Status: SHIPPED | OUTPATIENT
Start: 2024-09-05 | End: 2024-10-05

## 2024-09-05 ASSESSMENT — PAIN - FUNCTIONAL ASSESSMENT: PAIN_FUNCTIONAL_ASSESSMENT: 0-10

## 2024-09-05 ASSESSMENT — PAIN SCALES - GENERAL: PAINLEVEL_OUTOF10: 6

## 2024-09-05 NOTE — PROGRESS NOTES
Subjective   Patient ID: Roselyn Muhammad is a 69 y.o. female with a past medical history of low back and left leg pain, systemic lupus, arthritis, fibromyalgia, insomnia, and depression and bilateral shoulder arthritis who presents for follow up.        HPI:   Roselyn Muhammad is a 69 y.o. female with a past medical history of low back and left leg pain, systemic lupus, arthritis, fibromyalgia, insomnia, and depression and bilateral shoulder arthritis who presents for follow up.  She is here for tramadol refill.  Patient reports that her pain has been maintained over the last couple months since we last saw her.  She has been taking tramadol twice per day as prescribed and the tramadol helps bring her pain down by roughly 50%.  She reports that the tramadol makes it bearable for her to do exercises and go throughout the day.  She does not report any side effects of tramadol.  She reports that on average her pain is about a 7 out of 10 however can be higher if she does more activity on a given day.  She is trying to do more exercise in the as active as possible however she continues to have significant pain.  She is also taking meloxicam, mirtazapine, duloxetine.  Patient reports that when she does not take her tramadol it is difficult for her to go about her day without significant pain.       I have personally reviewed the OARRS report for Roselyn Muhammad I have considered the risks of abuse, dependence, addiction and diversion    OARRS:  No data recorded  I have personally reviewed the OARRS report for Roselyn Muhammad. I have considered the risks of abuse, dependence, addiction and diversion and I believe that it is clinically appropriate for Roselyn Muhammad to be prescribed this medication    Is the patient prescribed a combination of a benzodiazepine and opioid?  No  Controlled Substance Agreement:  Reviewed Controlled Substance Agreement including but not limited to the benefits, risks, and alternatives to treatment  with a Controlled Substance medication(s).    Last Urine Drug Screen:  Recent Results (from the past 8760 hour(s))   Amphetamine Confirm, Urine    Collection Time: 12/08/23  8:02 AM   Result Value Ref Range    Methamphetamine Quant, Ur <200 ng/mL    MDA, Urine <200 ng/mL    MDEA, Urine <200 ng/mL    Phentermine,Urine <200 ng/mL    Amphetamines,Urine <50 ng/mL    MDMA, Urine <200 ng/mL   Drug Screen, Urine With Reflex to Confirmation    Collection Time: 12/08/23  8:02 AM   Result Value Ref Range    Amphetamine Screen, Urine Presumptive Positive (A) Presumptive Negative    Barbiturate Screen, Urine Presumptive Negative Presumptive Negative    Benzodiazepines Screen, Urine Presumptive Negative Presumptive Negative    Cannabinoid Screen, Urine Presumptive Negative Presumptive Negative    Cocaine Metabolite Screen, Urine Presumptive Negative Presumptive Negative    Fentanyl Screen, Urine Presumptive Negative Presumptive Negative    Opiate Screen, Urine Presumptive Negative Presumptive Negative    Oxycodone Screen, Urine Presumptive Negative Presumptive Negative    PCP Screen, Urine Presumptive Negative Presumptive Negative     Results are as expected.       Review of Systems   13-point ROS done and negative except for HPI.     Current Outpatient Medications   Medication Instructions    acetaminophen (TYLENOL) 650 mg, oral, Every 6 hours PRN    buPROPion SR (Wellbutrin SR) 100 mg 12 hr tablet Take one tablet daily in afternoonDo not crush, chew, or split.    buPROPion SR (Wellbutrin SR) 150 mg 12 hr tablet Take one daily in the morning    diclofenac sodium 1 % kit transdermal, Daily    diphenhydrAMINE 12.5 mg/5 mL liquid oral    DULoxetine (CYMBALTA) 60 mg, oral, 2 times daily    famotidine (PEPCID) 40 mg, oral, Nightly    mirtazapine (Remeron) 7.5 mg tablet Take half to one tablet at bedtime.    multivitamin with minerals (multivit-min-iron fum-folic ac) tablet 1 tablet, oral, Daily    omega-3 fatty acids-fish oil (Fish  OiL) 340-1,000 mg capsule oral    polyethylene glycol-electrolytes (Nulytely) 420 gram solution Drink 1/2 starting at 6 pm the night before your procedure then drink the 2nd 1/2 5 hours before procedure arrival time    vit C,Q-Tv-byixu-lutein-zeaxan (PreserVision AREDS-2) 250-90-40-1 mg capsule oral, 2 times daily       Past Medical History:   Diagnosis Date    Acute vaginitis     Vaginal infection    Gangrene, not elsewhere classified (Multi)     Necrosis    Other conditions influencing health status     History of pregnancy    Other conditions influencing health status     Teen pregnancy    Other specified acute skin changes due to ultraviolet radiation     Photosensitivity    Other specified noninflammatory disorders of vagina 04/06/2015    Vaginal dryness    Personal history of gestational diabetes     History of gestational diabetes    Personal history of other diseases of the musculoskeletal system and connective tissue 01/30/2018    History of fibromyalgia    Personal history of other diseases of the musculoskeletal system and connective tissue     Personal history of osteomyelitis    Personal history of other endocrine, nutritional and metabolic disease 12/29/2014    History of hyperparathyroidism    Personal history of other infectious and parasitic diseases     History of gonorrhea    Personal history of other medical treatment     H/O mammogram        Past Surgical History:   Procedure Laterality Date    ANKLE SURGERY  05/08/2014    Ankle Surgery    APPENDECTOMY  02/14/2014    Appendectomy    CATARACT EXTRACTION  02/14/2014    Cataract Surgery    FEMUR FRACTURE SURGERY  02/14/2014    Femur Repair    KNEE SURGERY  02/14/2014    Knee Surgery Left    OTHER SURGICAL HISTORY  02/14/2014    Biopsy Bone Needle    RETINAL DETACHMENT SURGERY  02/14/2014    Repair Of Retinal Detachment    TOTAL KNEE ARTHROPLASTY  08/23/2018    Knee Replacement    TUBAL LIGATION  02/14/2014    Tubal Ligation        No family history  on file.     Allergies   Allergen Reactions    Citrus And Derivatives Itching    Nafcillin Other    Pregabalin Swelling and Unknown    Sulfa (Sulfonamide Antibiotics) Other    Sulfamethoxazole-Trimethoprim Rash        Objective     There were no vitals filed for this visit.     Physical Exam  General: NAD, well groomed, well nourished  Eyes: Non-icteric sclera, EOMI  Ears, Nose, Mouth, and Throat: External ears and nose appear to be without deformity or rash. No lesions or masses noted. Hearing is grossly intact.   Neck: Trachea midline  Respiratory: Nonlabored breathing   Cardiovascular: no peripheral edema   Skin: No rashes or open lesions/ulcers identified on skin.    Back:   Palpation: No tenderness to palpation over lumbar paraspinous muscles.   Neg SLR    Neurologic:   Cranial nerves grossly intact.   Strength 5/5 and symmetric plantar/dorsiflexion   Sensation: Normal to light touch throughout intact throughout.    Psychiatric: Alert, orientation to person, place, and time. Cooperative.      Assessment/Plan   Roselyn Muhammad is a 69-year-old female with a past medical history of low back and left leg pain, systemic lupus, arthritis, fibromyalgia, insomnia, and depression and bilateral shoulder arthritis who presents for follow up and medication refill.  Patient reports that she has significant pain relief roughly 50% pain relief when she takes the tramadol and it makes it bearable for her to do activities throughout the house.  She reports that the tramadol also facilitates her being able to do more exercises and stay more active.  She reports that when she does not take the tramadol she notices significant difference in her pain.  She reports that her pain can sometimes be as high as 10 out of 10 when she is very active.  She denies any side effects of the tramadol and she reports that she is taking it as prescribed twice per day.  She understands that opiates have a high risk of abuse, side effects such as  respiratory depression and even death, tolerance and addiction.  Patient reports that she is taking the medication as prescribed and does not abuse or misuse the medication.  She has no concerning signs or symptoms on her physical exam.  We will update her narcotic agreement today in office.  Will also obtain a updated urine drug screen.  We discussed with the patient that if her drug screen is positive for any other substances that she is not prescribed by a physician that we will need to reconsider prescribing her tramadol.  Patient reports that she understands.  Discussed with patient that she should have Narcan in her house and that she should educate whoever she lives with on the use of Narcan.  Follow-up in 3 months.    I reviewed the patient's PDMP and her unintentional overdose risk model score is 40, which is below average.  She is not on any benzodiazepines in addition to her narcotics and she has not been receiving narcotics from any other physicians other than within our [facility] pain practice.    Plan:  -Update narcotic agreement in the office today.  -Obtain updated urine drug screen  -Will refill her tramadol prescription today  -Educated patient on Narcan    Follow up: In 3 months    The patient was invited to contact us back anytime with any questions or concerns and follow-up with us in the office as needed.     Diagnoses and all orders for this visit:  Encounter for drug screening  -     Drug Screen, Urine With Reflex to Confirmation; Future  Encounter for therapeutic drug level monitoring  -     Drug Screen, Urine With Reflex to Confirmation; Future      This note was generated with the aid of dictation software, there may be typos despite my attempts at proofreading.   The patient was discussed and seen with Dr. Mena.  Eulogio Galeana MD  PGY-5  Interventional Pain Fellow

## 2024-09-10 ENCOUNTER — APPOINTMENT (OUTPATIENT)
Dept: BEHAVIORAL HEALTH | Facility: CLINIC | Age: 69
End: 2024-09-10
Payer: MEDICARE

## 2024-09-10 DIAGNOSIS — F32.89 OTHER DEPRESSION: ICD-10-CM

## 2024-09-10 DIAGNOSIS — F32.A DEPRESSION, UNSPECIFIED DEPRESSION TYPE: ICD-10-CM

## 2024-09-10 PROCEDURE — 1160F RVW MEDS BY RX/DR IN RCRD: CPT | Performed by: PSYCHIATRY & NEUROLOGY

## 2024-09-10 PROCEDURE — 99214 OFFICE O/P EST MOD 30 MIN: CPT | Performed by: PSYCHIATRY & NEUROLOGY

## 2024-09-10 PROCEDURE — 1159F MED LIST DOCD IN RCRD: CPT | Performed by: PSYCHIATRY & NEUROLOGY

## 2024-09-10 RX ORDER — BUPROPION HYDROCHLORIDE 150 MG/1
TABLET, EXTENDED RELEASE ORAL
Qty: 90 TABLET | Refills: 1 | Status: SHIPPED | OUTPATIENT
Start: 2024-09-10

## 2024-09-10 RX ORDER — DULOXETIN HYDROCHLORIDE 60 MG/1
60 CAPSULE, DELAYED RELEASE ORAL 2 TIMES DAILY
Qty: 180 CAPSULE | Refills: 1 | Status: SHIPPED | OUTPATIENT
Start: 2024-09-10

## 2024-09-10 RX ORDER — BUPROPION HYDROCHLORIDE 100 MG/1
TABLET, EXTENDED RELEASE ORAL
Qty: 90 TABLET | Refills: 1 | Status: SHIPPED | OUTPATIENT
Start: 2024-09-10

## 2024-09-10 NOTE — PROGRESS NOTES
"Outpatient Psychiatry Follow up visit    Ms. Roselyn Muhammad is a 69-year-old woman with a history of insomnia, depression, fibromyalgia, hypertension, SLE, Raynaud's phenomena, hypertension, monoclonal gammopathy, weight gain and chronic kidney disease. Follow up done virtually today.     Virtual or Telephone Consent    An interactive audio and video telecommunication system which permits real time communications between the patient (at the originating site) and provider (at the distant site) was utilized to provide this telehealth service.   Verbal consent was requested and obtained from Roselyn Muhammad on this date, 09/10/24 for a telehealth visit.     Subjective:   She says she is \"okay.\"     She says her mother  in July; she had been living in a nursing home. She says she is \"okay\" but it feels \"different\" not being able to talk to her mother.      She says her mood is \"okay.\"   She says she starts feeling better physically around the evening, so she starts doing things around that time. She says her sleep pattern is \"off.\" She says she stays up till early morning and then sleeps in; she may take a nap during the day.   She says she only takes mirtazapine occasionally.   Appetite is \"okay.\" Says she has gained some weight but is working on losing weight.   Says energy and motivation are still low.   Denies any death wishes, suicidal thoughts, intent or plans.      Denies excessive worry or anxiety. Denies any panic attacks.     No AVH, paranoia or delusions.      She says she wants to talk to a therapist or counselor \"to clear up some things from the past.\"     Does not smoke cigarettes.   Does not use marijuana or illicit drugs.   May drink a little wine every now and then.     She says her back pain is not too bad right now. She had been going to a chiropractor but she stopped going.      Lives by herself.   Uses [employer].      Current medications:   Cymbalta 60mg twice daily - takes one in the morning " "and one in the afternoon  Bupropion SR 150mg in the morning and 100mg in the afternoon;.  Mirtazapine 7.5mg at bedtime - takes half tablet only as needed for sleep; only uses it rarely.     Past medications:   Abilify       Psychiatric Review Of Systems:     As above.     Medical Review Of Systems:    Pertinent items are noted in HPI.    Record Review: moderate     Mental Status Evaluation:  Appearance: Fair grooming.   Behavior/Attitude: Cooperative.   Speech: Regular in rate, tone and volume. No pressure.  Mood: \"okay.\"  Affect: close to euthymic. No apparent distress.   Thought process: Goal-directed; organized.   Thought content: No paranoid or delusional content. No hallucinations in auditory, visual or other sensory modalities.   Suicidal ideation: denied.  Homicidal ideation: denied.   Insight: Fair  Judgment: Fair  Recent and remote memory: fair recall of recent and remote autobiographical memories.  Attention/concentration: intact during telephone visit.   Language: No aphasia or paraphasic errors.   Fund of knowledge: Average    PMH/PSH:  Past Medical History:   Diagnosis Date    Acute vaginitis     Vaginal infection    Gangrene, not elsewhere classified (Multi)     Necrosis    Other conditions influencing health status     History of pregnancy    Other conditions influencing health status     Teen pregnancy    Other specified acute skin changes due to ultraviolet radiation     Photosensitivity    Other specified noninflammatory disorders of vagina 04/06/2015    Vaginal dryness    Personal history of gestational diabetes     History of gestational diabetes    Personal history of other diseases of the musculoskeletal system and connective tissue 01/30/2018    History of fibromyalgia    Personal history of other diseases of the musculoskeletal system and connective tissue     Personal history of osteomyelitis    Personal history of other endocrine, nutritional and metabolic disease 12/29/2014    History of " hyperparathyroidism    Personal history of other infectious and parasitic diseases     History of gonorrhea    Personal history of other medical treatment     H/O mammogram        Meds  Current Outpatient Medications on File Prior to Visit   Medication Sig Dispense Refill    acetaminophen (Tylenol) 325 mg tablet Take 2 tablets (650 mg) by mouth every 6 hours if needed.      buPROPion SR (Wellbutrin SR) 100 mg 12 hr tablet Take one tablet daily in afternoonDo not crush, chew, or split. 90 tablet 1    buPROPion SR (Wellbutrin SR) 150 mg 12 hr tablet Take one daily in the morning 90 tablet 1    diclofenac sodium 1 % kit Place on the skin once daily.      diphenhydrAMINE 12.5 mg/5 mL liquid Take by mouth.      DULoxetine (Cymbalta) 60 mg DR capsule Take 1 capsule (60 mg) by mouth 2 times a day. 180 capsule 1    famotidine (Pepcid) 40 mg tablet Take 1 tablet (40 mg) by mouth once daily at bedtime. 90 tablet 1    mirtazapine (Remeron) 7.5 mg tablet Take half to one tablet at bedtime. 30 tablet 1    multivitamin with minerals (multivit-min-iron fum-folic ac) tablet Take 1 tablet by mouth once daily.      omega-3 fatty acids-fish oil (Fish OiL) 340-1,000 mg capsule Take by mouth.      polyethylene glycol-electrolytes (Nulytely) 420 gram solution Drink 1/2 starting at 6 pm the night before your procedure then drink the 2nd 1/2 5 hours before procedure arrival time 4000 mL 0    traMADol (Ultram) 50 mg tablet Take 1 tablet (50 mg) by mouth 2 times a day. 60 tablet 0    vit C,J-Qj-bzory-lutein-zeaxan (PreserVision AREDS-2) 250-90-40-1 mg capsule Take by mouth twice a day.      [DISCONTINUED] traMADol (Ultram) 50 mg tablet Take 1 tablet (50 mg) by mouth 2 times a day. 60 tablet 0     No current facility-administered medications on file prior to visit.        Allergies:   Allergies   Allergen Reactions    Citrus And Derivatives Itching    Nafcillin Other    Pregabalin Swelling and Unknown    Sulfa (Sulfonamide Antibiotics) Other     Sulfamethoxazole-Trimethoprim Rash       Assessment/Plan   Assessment:   Ms. Roselyn Muhammad is a 69-year-old woman with a history of insomnia, depression, fibromyalgia, hypertension, SLE, Raynaud's phenomena, hypertension, monoclonal gammopathy, weight gain and chronic kidney disease. Follow up done virtually today.      9/10/24 - Mood is stable. No recent hallucinations. Has poor sleep hygiene.      Diagnosis:   Other specified depressive disorder, recurrent, in partial remission  Insomnia    Treatment Plan/Recommendations:   - Continue bupropion SR 150mg in morning and bupropion 100mg in the afternoon.   - Continue Cymbalta 60mg twice daily and   - May take mirtazapine 7.5mg half to one tablet at bedtime as needed for sleep.   - Counseling resources provided.   - Educated on sleep hygiene.   - Healthy lifestyle encouraged.   - Follow up in about 6 months.       Bertha Jung MD.

## 2024-09-10 NOTE — PATIENT INSTRUCTIONS
Plan:   - Continue bupropion SR 150mg in morning and bupropion 100mg in the afternoon.   - Continue Cymbalta 60mg twice daily.   - May take mirtazapine 7.5mg half to one tablet at bedtime as needed for sleep.   - Make sure you have consistent sleep routine - following the same steps every night before sleeping, going to bed at the same time each night and waking up at the same time every morning. Try to get about 8 hours of uninterrupted sleep each night. Unplug from electronics 30-60 minutes before bedtime. Avoid taking naps during the daytime.  - Please call  Psychiatry ([phone]), [facility] ([phone]) or Humanistic counseling ([phone]) to schedule psychotherapy.  - Follow up in about 6 months.   - Contact via [facility] or call sooner ([phone]) in case of any questions or concerns.  - Call [phone] for mental health crisis or suicidal thoughts, or call 911 or go to the nearest emergency room for emergencies.     - Brain healthy lifestyle:   - Make sure your medical conditions (if any) such as diabetes, high blood pressure, high cholesterol, thyroid disease sleep apnea are optimally controlled.   - Use eyeglasses or hearing aids appropriately if needed.   - Eat a heart healthy diet (like a Mediterranean diet; lots of fruits and vegetables, fish; low fat;)  - Exercise regularly as tolerated.   - Maintain good sleep hygiene; avoid daytime naps; try to get 7 to 8 hours of continuous sleep at night.   - Stay mentally active - puzzles, word searches, books, playing cards.  - Stay socially active and engaged.

## 2024-09-18 ENCOUNTER — TELEPHONE (OUTPATIENT)
Dept: PRIMARY CARE | Facility: CLINIC | Age: 69
End: 2024-09-18
Payer: MEDICARE

## 2024-09-18 NOTE — TELEPHONE ENCOUNTER
Jillian Michael nurse  Sudhakar left a voicemail message stating wanted to let you know they completed out the annual health risk assessment if your interested it's in the provider portal.

## 2024-10-04 DIAGNOSIS — M87.051 AVASCULAR NECROSIS OF BONE OF RIGHT HIP (MULTI): ICD-10-CM

## 2024-10-04 RX ORDER — TRAMADOL HYDROCHLORIDE 50 MG/1
50 TABLET ORAL 2 TIMES DAILY
Qty: 60 TABLET | Refills: 0 | Status: SHIPPED | OUTPATIENT
Start: 2024-10-04 | End: 2024-11-03

## 2024-10-23 ENCOUNTER — APPOINTMENT (OUTPATIENT)
Dept: GASTROENTEROLOGY | Facility: EXTERNAL LOCATION | Age: 69
End: 2024-10-23
Payer: MEDICARE

## 2024-11-04 DIAGNOSIS — M87.051 AVASCULAR NECROSIS OF BONE OF RIGHT HIP (MULTI): ICD-10-CM

## 2024-11-04 RX ORDER — TRAMADOL HYDROCHLORIDE 50 MG/1
50 TABLET ORAL 2 TIMES DAILY
Qty: 60 TABLET | Refills: 0 | Status: SHIPPED | OUTPATIENT
Start: 2024-11-04 | End: 2024-12-04

## 2024-12-03 DIAGNOSIS — M87.051 AVASCULAR NECROSIS OF BONE OF RIGHT HIP (MULTI): ICD-10-CM

## 2024-12-03 RX ORDER — TRAMADOL HYDROCHLORIDE 50 MG/1
50 TABLET ORAL 2 TIMES DAILY
Qty: 60 TABLET | Refills: 0 | Status: SHIPPED | OUTPATIENT
Start: 2024-12-03 | End: 2025-01-02

## 2024-12-09 DIAGNOSIS — K29.70 GASTRITIS WITHOUT BLEEDING, UNSPECIFIED CHRONICITY, UNSPECIFIED GASTRITIS TYPE: ICD-10-CM

## 2024-12-10 RX ORDER — FAMOTIDINE 40 MG/1
40 TABLET, FILM COATED ORAL NIGHTLY
Qty: 90 TABLET | Refills: 1 | Status: SHIPPED | OUTPATIENT
Start: 2024-12-10

## 2024-12-17 DIAGNOSIS — N18.9 CHRONIC KIDNEY DISEASE, UNSPECIFIED CKD STAGE: ICD-10-CM

## 2024-12-17 DIAGNOSIS — R53.83 OTHER FATIGUE: ICD-10-CM

## 2024-12-17 DIAGNOSIS — D89.2 PARAPROTEINEMIA: ICD-10-CM

## 2024-12-17 DIAGNOSIS — I73.00 RAYNAUD'S DISEASE WITHOUT GANGRENE: Primary | ICD-10-CM

## 2024-12-17 DIAGNOSIS — M32.19 SYSTEMIC LUPUS ERYTHEMATOSUS WITH OTHER ORGAN INVOLVEMENT, UNSPECIFIED SLE TYPE (MULTI): ICD-10-CM

## 2024-12-17 DIAGNOSIS — Z13.220 SCREENING, LIPID: ICD-10-CM

## 2024-12-17 DIAGNOSIS — M85.80 OSTEOPENIA, UNSPECIFIED LOCATION: ICD-10-CM

## 2024-12-18 ENCOUNTER — APPOINTMENT (OUTPATIENT)
Dept: PRIMARY CARE | Facility: CLINIC | Age: 69
End: 2024-12-18
Payer: MEDICARE

## 2024-12-19 ENCOUNTER — DOCUMENTATION (OUTPATIENT)
Dept: HEMATOLOGY/ONCOLOGY | Facility: HOSPITAL | Age: 69
End: 2024-12-19
Payer: MEDICARE

## 2024-12-19 NOTE — PROGRESS NOTES
RN sent patient a message asking her to call back because her benign heme appointment needs rescheduled and she needs to get labs done. Call back number reviewed.

## 2024-12-23 ENCOUNTER — TELEPHONE (OUTPATIENT)
Dept: HEMATOLOGY/ONCOLOGY | Facility: HOSPITAL | Age: 69
End: 2024-12-23
Payer: MEDICARE

## 2024-12-23 NOTE — TELEPHONE ENCOUNTER
RN called patient's emergency contact Virgilio and talked to him. RN asked if he could have patient call me at . I explained that patient needs to reschedule an appointment and needs to get some lab work done.

## 2025-01-06 DIAGNOSIS — M87.051 AVASCULAR NECROSIS OF BONE OF RIGHT HIP (MULTI): ICD-10-CM

## 2025-01-06 RX ORDER — TRAMADOL HYDROCHLORIDE 50 MG/1
50 TABLET ORAL 2 TIMES DAILY
Qty: 60 TABLET | Refills: 0 | Status: SHIPPED | OUTPATIENT
Start: 2025-01-06 | End: 2025-02-05

## 2025-01-06 NOTE — TELEPHONE ENCOUNTER
Pt called for medication refill Tramadol 50 mg take one tablet BID  to be filled to preferred pharmacy.

## 2025-01-09 ENCOUNTER — APPOINTMENT (OUTPATIENT)
Dept: PAIN MEDICINE | Facility: CLINIC | Age: 70
End: 2025-01-09
Payer: MEDICARE

## 2025-01-09 ENCOUNTER — APPOINTMENT (OUTPATIENT)
Dept: HEMATOLOGY/ONCOLOGY | Facility: CLINIC | Age: 70
End: 2025-01-09
Payer: MEDICARE

## 2025-01-09 ENCOUNTER — TELEPHONE (OUTPATIENT)
Dept: HEMATOLOGY/ONCOLOGY | Facility: HOSPITAL | Age: 70
End: 2025-01-09

## 2025-01-09 DIAGNOSIS — M19.90 ARTHRITIS: Primary | ICD-10-CM

## 2025-01-09 DIAGNOSIS — D47.2 MGUS (MONOCLONAL GAMMOPATHY OF UNKNOWN SIGNIFICANCE): ICD-10-CM

## 2025-01-09 PROCEDURE — 99214 OFFICE O/P EST MOD 30 MIN: CPT | Performed by: PAIN MEDICINE

## 2025-01-09 NOTE — PROGRESS NOTES
Virtual interview    69 y/o woman with history of long-standing low back and left leg pain, b/l shoulder arthritis, CKD, SLE, arthritis, fibromyalgia, insomnia, and depression who presents for a virtual follow-up visit.     Patient reports being out of tramadol for one week, used ibuprofen for pain control with mild improvement in pain. The pain is adequately controlled with tramadol, but on the days when she feels the pain exacerbates, she wishes she had a better relief in her symptoms. She has inquired about raising the dose of tramadol or switching to a different opiate medication. I explained it is not recommended at this time. Pt stated she has a CKD and asked how much ibuprofen she can take, I suggested she discusses this with her nephrologist.  She normally is doing home exercises, but has not done it recently.  Reports recent weight gain. She declined PT. I mentioned that we can send a referral if she changes her mind.    She denies new weakness, sensory losses, bowel or bladder incontinence.  OARRS report reviewed and no violations noted. Unintentional overdose risc score is 40. Avg 14.7 MME/day.       Plan:     - Continue current treatment with tramadol 50 mg twice daily as needed. Will send RF  - Resume home exercises  - Patient has declined PT for now

## 2025-01-09 NOTE — TELEPHONE ENCOUNTER
I rescheduled her 1/23/2025 at 220p. Shwe will arrange her own transportation. With [facility]. Reminded her this is [facility]. On the 1st floor at desk C Will ask  to mail. Will enter labs for next week when she sees her pcp.Carmen Diaz RN

## 2025-01-16 ENCOUNTER — APPOINTMENT (OUTPATIENT)
Dept: PRIMARY CARE | Facility: CLINIC | Age: 70
End: 2025-01-16
Payer: MEDICARE

## 2025-01-23 ENCOUNTER — TELEPHONE (OUTPATIENT)
Dept: HEMATOLOGY/ONCOLOGY | Facility: HOSPITAL | Age: 70
End: 2025-01-23
Payer: MEDICARE

## 2025-01-23 DIAGNOSIS — D47.2 MGUS (MONOCLONAL GAMMOPATHY OF UNKNOWN SIGNIFICANCE): ICD-10-CM

## 2025-01-23 NOTE — TELEPHONE ENCOUNTER
Patient was a no show 1/23/25 for Dr. Hernandez. I called patient she stated she never knew she had appt. New appt given 2/20 at 2p will ask  to mail and also left on her v. Mail.Carmen Diaz RN   TOLD HER TO GO TO  [facility].Carmen Diaz RN

## 2025-01-29 ENCOUNTER — TELEPHONE (OUTPATIENT)
Dept: BEHAVIORAL HEALTH | Facility: CLINIC | Age: 70
End: 2025-01-29

## 2025-01-29 ENCOUNTER — APPOINTMENT (OUTPATIENT)
Dept: PRIMARY CARE | Facility: CLINIC | Age: 70
End: 2025-01-29
Payer: MEDICARE

## 2025-01-29 DIAGNOSIS — K29.70 GASTRITIS WITHOUT BLEEDING, UNSPECIFIED CHRONICITY, UNSPECIFIED GASTRITIS TYPE: ICD-10-CM

## 2025-01-29 DIAGNOSIS — F32.89 OTHER DEPRESSION: ICD-10-CM

## 2025-01-29 DIAGNOSIS — F32.A DEPRESSION, UNSPECIFIED DEPRESSION TYPE: ICD-10-CM

## 2025-01-29 RX ORDER — BUPROPION HYDROCHLORIDE 100 MG/1
TABLET, EXTENDED RELEASE ORAL
Qty: 90 TABLET | Refills: 1 | Status: SHIPPED | OUTPATIENT
Start: 2025-01-29

## 2025-01-29 RX ORDER — DULOXETIN HYDROCHLORIDE 60 MG/1
60 CAPSULE, DELAYED RELEASE ORAL 2 TIMES DAILY
Qty: 180 CAPSULE | Refills: 1 | Status: SHIPPED | OUTPATIENT
Start: 2025-01-29

## 2025-01-29 RX ORDER — FAMOTIDINE 40 MG/1
40 TABLET, FILM COATED ORAL NIGHTLY
Qty: 90 TABLET | Refills: 1 | Status: SHIPPED | OUTPATIENT
Start: 2025-01-29

## 2025-01-29 RX ORDER — BUPROPION HYDROCHLORIDE 150 MG/1
TABLET, EXTENDED RELEASE ORAL
Qty: 90 TABLET | Refills: 1 | Status: SHIPPED | OUTPATIENT
Start: 2025-01-29

## 2025-02-04 ENCOUNTER — TELEMEDICINE (OUTPATIENT)
Dept: PRIMARY CARE | Facility: CLINIC | Age: 70
End: 2025-02-04
Payer: MEDICARE

## 2025-02-04 DIAGNOSIS — J40 BRONCHITIS: Primary | ICD-10-CM

## 2025-02-04 PROCEDURE — 99212 OFFICE O/P EST SF 10 MIN: CPT | Performed by: PEDIATRICS

## 2025-02-04 NOTE — PROGRESS NOTES
Subjective   Patient ID: Roselyn Muhammad is a 69 y.o. female who presents for cough as a video visit    HPI   Patient developed cough 4 days ago but it is getting better. No fever; is taking OTC cough medication; [place] herself wheeze at times;  Review of Systems  Pain under the breast when she coughs-->getting a little better  Not short of breath  Objective   There were no vitals taken for this visit.    Physical Exam  NAD  Assessment/Plan   Problem List Items Addressed This Visit    None  Visit Diagnoses         Codes    Bronchitis    -  Primary J40

## 2025-02-07 ENCOUNTER — APPOINTMENT (OUTPATIENT)
Dept: PRIMARY CARE | Facility: CLINIC | Age: 70
End: 2025-02-07
Payer: MEDICARE

## 2025-02-10 ENCOUNTER — TELEPHONE (OUTPATIENT)
Dept: PRIMARY CARE | Facility: CLINIC | Age: 70
End: 2025-02-10
Payer: MEDICARE

## 2025-02-13 DIAGNOSIS — M47.816 LUMBAR SPONDYLOSIS: ICD-10-CM

## 2025-02-13 RX ORDER — TRAMADOL HYDROCHLORIDE 50 MG/1
50 TABLET ORAL 2 TIMES DAILY
Qty: 60 TABLET | Refills: 0 | Status: SHIPPED | OUTPATIENT
Start: 2025-02-13 | End: 2025-03-15

## 2025-02-13 NOTE — TELEPHONE ENCOUNTER
Pt called for medication refill Tramadol 50 mg one tablet BID to be filled to preferred pharmacy. Last seen in office 1/9/25, last refill 1/13/25, UDS/CSA 9/5/24. OARRs reviewed

## 2025-02-27 ENCOUNTER — OFFICE VISIT (OUTPATIENT)
Dept: PRIMARY CARE | Facility: CLINIC | Age: 70
End: 2025-02-27
Payer: MEDICARE

## 2025-02-27 VITALS
DIASTOLIC BLOOD PRESSURE: 80 MMHG | HEART RATE: 94 BPM | WEIGHT: 196 LBS | BODY MASS INDEX: 35.85 KG/M2 | TEMPERATURE: 96.8 F | OXYGEN SATURATION: 99 % | SYSTOLIC BLOOD PRESSURE: 160 MMHG

## 2025-02-27 DIAGNOSIS — I73.00 RAYNAUD'S DISEASE WITHOUT GANGRENE: ICD-10-CM

## 2025-02-27 DIAGNOSIS — R10.32 LLQ PAIN: ICD-10-CM

## 2025-02-27 DIAGNOSIS — I10 HYPERTENSION, UNSPECIFIED TYPE: ICD-10-CM

## 2025-02-27 DIAGNOSIS — R74.8 ELEVATED LIVER ENZYMES: ICD-10-CM

## 2025-02-27 DIAGNOSIS — M32.19 SYSTEMIC LUPUS ERYTHEMATOSUS WITH OTHER ORGAN INVOLVEMENT, UNSPECIFIED SLE TYPE (MULTI): ICD-10-CM

## 2025-02-27 DIAGNOSIS — N18.9 CHRONIC KIDNEY DISEASE, UNSPECIFIED CKD STAGE: ICD-10-CM

## 2025-02-27 DIAGNOSIS — H90.3 SENSORINEURAL HEARING LOSS, BILATERAL: ICD-10-CM

## 2025-02-27 DIAGNOSIS — E66.01 CLASS 2 SEVERE OBESITY DUE TO EXCESS CALORIES WITH SERIOUS COMORBIDITY AND BODY MASS INDEX (BMI) OF 35.0 TO 35.9 IN ADULT: ICD-10-CM

## 2025-02-27 DIAGNOSIS — Z12.11 COLON CANCER SCREENING: ICD-10-CM

## 2025-02-27 DIAGNOSIS — Z78.0 MENOPAUSE: ICD-10-CM

## 2025-02-27 DIAGNOSIS — D47.2 MONOCLONAL GAMMOPATHY: ICD-10-CM

## 2025-02-27 DIAGNOSIS — I77.9 PERIPHERAL ARTERIAL OCCLUSIVE DISEASE (CMS-HCC): ICD-10-CM

## 2025-02-27 DIAGNOSIS — E78.01 FAMILIAL HYPERCHOLESTEROLEMIA: ICD-10-CM

## 2025-02-27 DIAGNOSIS — Z00.00 MEDICARE ANNUAL WELLNESS VISIT, SUBSEQUENT: Primary | ICD-10-CM

## 2025-02-27 DIAGNOSIS — Z12.31 SCREENING MAMMOGRAM FOR BREAST CANCER: ICD-10-CM

## 2025-02-27 DIAGNOSIS — E66.812 CLASS 2 SEVERE OBESITY DUE TO EXCESS CALORIES WITH SERIOUS COMORBIDITY AND BODY MASS INDEX (BMI) OF 35.0 TO 35.9 IN ADULT: ICD-10-CM

## 2025-02-27 DIAGNOSIS — K75.81 NONALCOHOLIC STEATOHEPATITIS (NASH): ICD-10-CM

## 2025-02-27 DIAGNOSIS — D89.2 PARAPROTEINEMIA: ICD-10-CM

## 2025-02-27 DIAGNOSIS — K21.9 GASTROESOPHAGEAL REFLUX DISEASE WITHOUT ESOPHAGITIS: ICD-10-CM

## 2025-02-27 PROBLEM — E66.811 CLASS 1 OBESITY DUE TO EXCESS CALORIES WITH SERIOUS COMORBIDITY AND BODY MASS INDEX (BMI) OF 31.0 TO 31.9 IN ADULT: Status: RESOLVED | Noted: 2024-06-25 | Resolved: 2025-02-27

## 2025-02-27 PROBLEM — E66.09 CLASS 1 OBESITY DUE TO EXCESS CALORIES WITH SERIOUS COMORBIDITY AND BODY MASS INDEX (BMI) OF 31.0 TO 31.9 IN ADULT: Status: RESOLVED | Noted: 2024-06-25 | Resolved: 2025-02-27

## 2025-02-27 RX ORDER — AMLODIPINE BESYLATE 5 MG/1
5 TABLET ORAL DAILY
Qty: 30 TABLET | Refills: 1 | Status: SHIPPED | OUTPATIENT
Start: 2025-02-27 | End: 2025-08-26

## 2025-02-27 ASSESSMENT — ENCOUNTER SYMPTOMS
FEVER: 0
ABDOMINAL PAIN: 1
NAUSEA: 0
CHILLS: 0
BLOOD IN STOOL: 0
FATIGUE: 0
CONSTIPATION: 0
ACTIVITY CHANGE: 0
DIARRHEA: 0
VOMITING: 0
APPETITE CHANGE: 0
COUGH: 0

## 2025-02-27 ASSESSMENT — ACTIVITIES OF DAILY LIVING (ADL)
BATHING: INDEPENDENT
DOING_HOUSEWORK: NEEDS ASSISTANCE
TAKING_MEDICATION: INDEPENDENT
DRESSING: INDEPENDENT
MANAGING_FINANCES: INDEPENDENT
GROCERY_SHOPPING: NEEDS ASSISTANCE

## 2025-02-27 ASSESSMENT — PATIENT HEALTH QUESTIONNAIRE - PHQ9
1. LITTLE INTEREST OR PLEASURE IN DOING THINGS: NOT AT ALL
SUM OF ALL RESPONSES TO PHQ9 QUESTIONS 1 AND 2: 0
2. FEELING DOWN, DEPRESSED OR HOPELESS: NOT AT ALL

## 2025-02-27 NOTE — PROGRESS NOTES
Subjective   Patient ID: Roselyn Muhammad is a 69 y.o. female who presents for Medicare Wellness    HPI   Patient states that she is having abdominal pain since being sick 3 weeks prior. When patient was sick, she was coughing and felt SOB, was seen in the ED 02/07. The patient was discharged with albuterol. Since then, cough has resolved but now she has abdominal painin the LUQ and LLQ that is worse when she lays on her stomach or presses on the area. Pain is not aggravated by eating or drinking. Patient took a laxative to see if it would help, and has been having normal bowel movements daily. Patient thinks it is muscular pain from coughing weeks ago. Denies N/V/D, fevers.    Review of Systems   Constitutional:  Negative for activity change, appetite change, chills, fatigue and fever.   Respiratory:  Negative for cough.    Gastrointestinal:  Positive for abdominal pain. Negative for blood in stool, constipation, diarrhea, nausea and vomiting.   Skin: Negative.        Objective   /80 (BP Location: Left arm, Patient Position: Sitting)   Pulse 94   Temp 36 °C (96.8 °F) (Skin)   Wt 88.9 kg (196 lb)   SpO2 99%   BMI 35.85 kg/m²     Physical Exam  Vitals reviewed.   Constitutional:       General: She is not in acute distress.  HENT:      Head: Normocephalic.      Right Ear: Tympanic membrane normal.      Left Ear: Tympanic membrane normal.      Nose: Nose normal.      Mouth/Throat:      Pharynx: Oropharynx is clear.   Cardiovascular:      Rate and Rhythm: Normal rate and regular rhythm.      Heart sounds: Normal heart sounds.   Pulmonary:      Breath sounds: Normal breath sounds.   Abdominal:      Palpations: Abdomen is soft.      Tenderness: There is abdominal tenderness.   Musculoskeletal:         General: No tenderness.   Skin:     Findings: No rash.   Neurological:      General: No focal deficit present.      Mental Status: She is alert.   Psychiatric:         Mood and Affect: Mood normal.          Assessment/Plan   Problem List Items Addressed This Visit             ICD-10-CM    CKD (chronic kidney disease) N18.9     Has not seen Dr Kaur for a while         Relevant Medications    dapagliflozin propanediol (Farxiga) 10 mg    Other Relevant Orders    Albumin-Creatinine Ratio, Urine Random (Completed)    Referral to Nephrology    US renal complete    Monoclonal gammopathy D47.2     Has hematology appt coming up         Paraproteinemia D89.2    RESOLVED: Peripheral arterial occlusive disease (CMS-HCC) I77.9    Raynaud's disease I73.00    Sensorineural hearing loss, bilateral H90.3    Systemic lupus erythematosus (Multi) M32.9    Relevant Orders    TAYLOR (Completed)    Sedimentation Rate (Completed)    Referral to Rheumatology    Class 2 severe obesity due to excess calories with serious comorbidity and body mass index (BMI) of 35.0 to 35.9 in adult E66.812, E66.01, Z68.35    Relevant Orders    Comprehensive Metabolic Panel (Completed)    Lipid Panel    CBC (Completed)    Thyroid Stimulating Hormone (Completed)    Elevated liver enzymes R74.8    Relevant Orders    Hepatitis panel, acute (Completed)    RESOLVED: Gastroesophageal reflux disease K21.9     Has heartburn if she eats certain foods; takes famotidine          Other Visit Diagnoses         Codes    Medicare annual wellness visit, subsequent    -  Primary Z00.00    Colon cancer screening     Z12.11    Relevant Orders    Cologuard® colon cancer screening    Screening mammogram for breast cancer     Z12.31    Relevant Orders    BI mammo bilateral screening tomosynthesis    Menopause     Z78.0    Relevant Orders    XR DEXA bone density    LLQ pain     R10.32    Relevant Orders    CT abdomen pelvis wo IV contrast    Hypertension, unspecified type     I10    Relevant Medications    amLODIPine (Norvasc) 5 mg tablet    Familial hypercholesterolemia     E78.01    Relevant Orders    Thyroid Stimulating Hormone (Completed)    Nonalcoholic steatohepatitis  (COLLINS)     K75.81    Relevant Orders    Hepatitis panel, acute (Completed)

## 2025-02-28 LAB
ALBUMIN SERPL-MCNC: 3.7 G/DL (ref 3.6–5.1)
ALBUMIN/CREAT UR: 2 MG/G CREAT
ALP SERPL-CCNC: 73 U/L (ref 37–153)
ALT SERPL-CCNC: 17 U/L (ref 6–29)
ANA PAT SER IF-IMP: ABNORMAL
ANA SER QL IF: POSITIVE
ANA TITR SER IF: ABNORMAL TITER
ANION GAP SERPL CALCULATED.4IONS-SCNC: 9 MMOL/L (CALC) (ref 7–17)
AST SERPL-CCNC: 20 U/L (ref 10–35)
BILIRUB SERPL-MCNC: 0.3 MG/DL (ref 0.2–1.2)
BUN SERPL-MCNC: 11 MG/DL (ref 7–25)
CALCIUM SERPL-MCNC: 8.7 MG/DL (ref 8.6–10.4)
CHLORIDE SERPL-SCNC: 105 MMOL/L (ref 98–110)
CO2 SERPL-SCNC: 24 MMOL/L (ref 20–32)
CREAT SERPL-MCNC: 1.22 MG/DL (ref 0.6–1)
CREAT UR-MCNC: 128 MG/DL (ref 20–275)
EGFRCR SERPLBLD CKD-EPI 2021: 47 ML/MIN/1.73M2
ERYTHROCYTE [DISTWIDTH] IN BLOOD BY AUTOMATED COUNT: 13.5 % (ref 11–15)
ERYTHROCYTE [SEDIMENTATION RATE] IN BLOOD BY WESTERGREN METHOD: 46 MM/H
GLUCOSE SERPL-MCNC: 71 MG/DL (ref 65–139)
HAV IGM SERPL QL IA: NORMAL
HBV CORE IGM SERPL QL IA: NORMAL
HBV SURFACE AG SERPL QL IA: NORMAL
HCT VFR BLD AUTO: 39.3 % (ref 35–45)
HCV AB SERPL QL IA: NORMAL
HGB BLD-MCNC: 13.1 G/DL (ref 11.7–15.5)
MCH RBC QN AUTO: 33.1 PG (ref 27–33)
MCHC RBC AUTO-ENTMCNC: 33.3 G/DL (ref 32–36)
MCV RBC AUTO: 99.2 FL (ref 80–100)
MICROALBUMIN UR-MCNC: 0.3 MG/DL
PLATELET # BLD AUTO: 242 THOUSAND/UL (ref 140–400)
PMV BLD REES-ECKER: 12 FL (ref 7.5–12.5)
POTASSIUM SERPL-SCNC: 4.1 MMOL/L (ref 3.5–5.3)
PROT SERPL-MCNC: 7.3 G/DL (ref 6.1–8.1)
RBC # BLD AUTO: 3.96 MILLION/UL (ref 3.8–5.1)
SODIUM SERPL-SCNC: 138 MMOL/L (ref 135–146)
TSH SERPL-ACNC: 1.57 MIU/L (ref 0.4–4.5)
WBC # BLD AUTO: 7.3 THOUSAND/UL (ref 3.8–10.8)

## 2025-03-01 PROBLEM — H91.90 HEARING LOSS: Status: ACTIVE | Noted: 2025-03-01

## 2025-03-01 PROBLEM — D64.9 ANEMIA: Status: ACTIVE | Noted: 2025-03-01

## 2025-03-01 PROBLEM — K29.70 GASTRITIS: Status: ACTIVE | Noted: 2025-03-01

## 2025-03-01 PROBLEM — H91.90 HEARING LOSS: Status: RESOLVED | Noted: 2025-03-01 | Resolved: 2025-03-01

## 2025-03-01 PROBLEM — I77.9 PERIPHERAL ARTERIAL OCCLUSIVE DISEASE (CMS-HCC): Status: RESOLVED | Noted: 2023-07-19 | Resolved: 2025-03-01

## 2025-03-01 PROBLEM — K21.9 GASTROESOPHAGEAL REFLUX DISEASE: Status: ACTIVE | Noted: 2025-03-01

## 2025-03-01 PROBLEM — R63.5 WEIGHT GAIN: Status: RESOLVED | Noted: 2023-07-19 | Resolved: 2025-03-01

## 2025-03-01 PROBLEM — R01.1 UNDIAGNOSED CARDIAC MURMURS: Status: RESOLVED | Noted: 2023-07-19 | Resolved: 2025-03-01

## 2025-03-01 PROBLEM — K21.9 GASTROESOPHAGEAL REFLUX DISEASE: Status: RESOLVED | Noted: 2025-03-01 | Resolved: 2025-03-01

## 2025-03-01 RX ORDER — DAPAGLIFLOZIN 10 MG/1
10 TABLET, FILM COATED ORAL DAILY
Qty: 100 TABLET | Refills: 0 | Status: SHIPPED | OUTPATIENT
Start: 2025-03-01 | End: 2026-04-05

## 2025-03-05 ENCOUNTER — TELEPHONE (OUTPATIENT)
Dept: PRIMARY CARE | Facility: CLINIC | Age: 70
End: 2025-03-05
Payer: MEDICARE

## 2025-03-05 DIAGNOSIS — I10 BENIGN ESSENTIAL HYPERTENSION: Primary | ICD-10-CM

## 2025-03-05 RX ORDER — LISINOPRIL 40 MG/1
40 TABLET ORAL DAILY
Qty: 90 TABLET | Refills: 0 | Status: SHIPPED | OUTPATIENT
Start: 2025-03-05 | End: 2026-04-09

## 2025-03-06 LAB
ALBUMIN SERPL ELPH-MCNC: 3.5 G/DL (ref 3.8–4.8)
ALBUMIN SERPL-MCNC: 3.7 G/DL (ref 3.6–5.1)
ALBUMIN/CREAT UR: 2 MG/G CREAT
ALP SERPL-CCNC: 73 U/L (ref 37–153)
ALPHA1 GLOB SERPL ELPH-MCNC: 0.3 G/DL (ref 0.2–0.3)
ALPHA2 GLOB SERPL ELPH-MCNC: 0.8 G/DL (ref 0.5–0.9)
ALT SERPL-CCNC: 17 U/L (ref 6–29)
ANA PAT SER IF-IMP: ABNORMAL
ANA SER QL IF: POSITIVE
ANA TITR SER IF: ABNORMAL TITER
ANION GAP SERPL CALCULATED.4IONS-SCNC: 9 MMOL/L (CALC) (ref 7–17)
AST SERPL-CCNC: 20 U/L (ref 10–35)
BETA1 GLOB SERPL ELPH-MCNC: 0.4 G/DL (ref 0.4–0.6)
BETA2 GLOB SERPL ELPH-MCNC: 0.8 G/DL (ref 0.2–0.5)
BILIRUB SERPL-MCNC: 0.3 MG/DL (ref 0.2–1.2)
BUN SERPL-MCNC: 11 MG/DL (ref 7–25)
CALCIUM SERPL-MCNC: 8.7 MG/DL (ref 8.6–10.4)
CHLORIDE SERPL-SCNC: 105 MMOL/L (ref 98–110)
CO2 SERPL-SCNC: 24 MMOL/L (ref 20–32)
CREAT SERPL-MCNC: 1.22 MG/DL (ref 0.6–1)
CREAT UR-MCNC: 128 MG/DL (ref 20–275)
EGFRCR SERPLBLD CKD-EPI 2021: 47 ML/MIN/1.73M2
ERYTHROCYTE [DISTWIDTH] IN BLOOD BY AUTOMATED COUNT: 13.5 % (ref 11–15)
ERYTHROCYTE [SEDIMENTATION RATE] IN BLOOD BY WESTERGREN METHOD: 46 MM/H
GAMMA GLOB SERPL ELPH-MCNC: 1.5 G/DL (ref 0.8–1.7)
GLUCOSE SERPL-MCNC: 71 MG/DL (ref 65–139)
HAV IGM SERPL QL IA: NORMAL
HBV CORE IGM SERPL QL IA: NORMAL
HBV SURFACE AG SERPL QL IA: NORMAL
HCT VFR BLD AUTO: 39.3 % (ref 35–45)
HCV AB SERPL QL IA: NORMAL
HGB BLD-MCNC: 13.1 G/DL (ref 11.7–15.5)
INTERPRETATION SERPL IFE-IMP: ABNORMAL
M PROTEIN 1 SERPL ELPH-MCNC: ABNORMAL G/L
MCH RBC QN AUTO: 33.1 PG (ref 27–33)
MCHC RBC AUTO-ENTMCNC: 33.3 G/DL (ref 32–36)
MCV RBC AUTO: 99.2 FL (ref 80–100)
MICROALBUMIN UR-MCNC: 0.3 MG/DL
PLATELET # BLD AUTO: 242 THOUSAND/UL (ref 140–400)
PMV BLD REES-ECKER: 12 FL (ref 7.5–12.5)
POTASSIUM SERPL-SCNC: 4.1 MMOL/L (ref 3.5–5.3)
PROT PATTERN SERPL ELPH-IMP: ABNORMAL
PROT SERPL-MCNC: 7.3 G/DL (ref 6.1–8.1)
PROT SERPL-MCNC: 7.3 G/DL (ref 6.1–8.1)
RBC # BLD AUTO: 3.96 MILLION/UL (ref 3.8–5.1)
SODIUM SERPL-SCNC: 138 MMOL/L (ref 135–146)
TSH SERPL-ACNC: 1.57 MIU/L (ref 0.4–4.5)
WBC # BLD AUTO: 7.3 THOUSAND/UL (ref 3.8–10.8)

## 2025-03-06 NOTE — TELEPHONE ENCOUNTER
Will stop amlodipine due to stomach aches and take lisinopril again 40 mg daily (had stopped it 9/23 due to low BP)

## 2025-03-17 DIAGNOSIS — M47.816 LUMBAR SPONDYLOSIS: ICD-10-CM

## 2025-03-17 RX ORDER — TRAMADOL HYDROCHLORIDE 50 MG/1
50 TABLET ORAL 2 TIMES DAILY
Qty: 60 TABLET | Refills: 0 | Status: SHIPPED | OUTPATIENT
Start: 2025-03-17 | End: 2025-04-16

## 2025-03-17 NOTE — TELEPHONE ENCOUNTER
Pt called for medication refill Tramadol 50 mg one tablet BID to be filled to preferred pharmacy. Last follow up 1/9/25. Last refill 2/13/25, UDS/CSA 9/5/24. OARRs reviewed.

## 2025-03-18 ENCOUNTER — APPOINTMENT (OUTPATIENT)
Dept: BEHAVIORAL HEALTH | Facility: CLINIC | Age: 70
End: 2025-03-18
Payer: MEDICARE

## 2025-03-18 DIAGNOSIS — F32.89 OTHER DEPRESSION: ICD-10-CM

## 2025-03-18 DIAGNOSIS — G47.00 INSOMNIA, UNSPECIFIED TYPE: ICD-10-CM

## 2025-03-18 PROCEDURE — 1159F MED LIST DOCD IN RCRD: CPT | Performed by: PSYCHIATRY & NEUROLOGY

## 2025-03-18 PROCEDURE — 99213 OFFICE O/P EST LOW 20 MIN: CPT | Performed by: PSYCHIATRY & NEUROLOGY

## 2025-03-18 PROCEDURE — 1160F RVW MEDS BY RX/DR IN RCRD: CPT | Performed by: PSYCHIATRY & NEUROLOGY

## 2025-03-18 NOTE — PROGRESS NOTES
"Outpatient Psychiatry Follow up visit    Ms. Roselyn Muhammad is a 69-year-old woman with a history of insomnia, depression, fibromyalgia, hypertension, SLE, Raynaud's phenomena, hypertension, monoclonal gammopathy, weight gain and chronic kidney disease. Follow up done virtually today.     Virtual or Telephone Consent    An interactive audio and video telecommunication system which permits real time communications between the patient (at the originating site) and provider (at the distant site) was utilized to provide this telehealth service.   Verbal consent was requested and obtained from Roselyn Muhammad on this date, 03/18/25 for a telehealth visit.     Subjective:     She says she is \"okay.\"      She says her mood is \"okay.\"    She says she was told by her primary care doctor that her \"numbers\" for kidney and liver don't look good. Reviewed labs from 2/27/25 - liver enzymes were normal but 2/7/25 showed elevated AST (64) and ASL (40), but EGFR was 47 and creatinine 1.22. She says she has been drinking more water.     She says her sleep is okay; sometimes, she may stay up late watching TV.   Appetite is \"okay.\" She says she has been having trouble getting herself to exercise. She says she has gained weight. She says she keeps trying to make herself exercise but is disappointed that she has not been able to.   Says energy and motivation are still low.   Denies any death wishes, suicidal thoughts, intent or plans.      Denies excessive worry or anxiety. Denies any panic attacks.     No AVH, paranoia or delusions.     Does not smoke cigarettes.   Does not use marijuana or illicit drugs.   May have an alcoholic drink occasionally.      Lives by herself. She has a home health aide 25 hours a week - they help with housework, cooking, taking out garbage.   Manages finances and medications independently.   Uses [employer].      Current medications:   Cymbalta 60mg twice daily - takes one in the morning and one in the " "afternoon  Bupropion SR 150mg in the morning and 100mg in the afternoon;.  Mirtazapine 7.5mg at bedtime - takes half tablet only as needed for sleep; says she only uses it once in a while.      Past medications:   Abilify     Psychiatric Review Of Systems:     As above.     Medical Review Of Systems:    Pertinent items are noted in HPI.    Record Review: moderate     Mental Status Evaluation:  Appearance: Fair grooming. Lying in bed.   Behavior/Attitude: Cooperative.   Speech: Regular in rate, tone and volume. No pressure.  Mood: \"okay.\"  Affect: close to euthymic. No apparent distress.   Thought process: Goal-directed; organized.   Thought content: No paranoid or delusional content. No hallucinations in auditory, visual or other sensory modalities.   Suicidal ideation: denied.  Homicidal ideation: denied.   Insight: Fair  Judgment: Fair  Recent and remote memory: fair recall of recent and remote autobiographical memories.  Attention/concentration: intact during telephone visit.   Language: No aphasia or paraphasic errors.   Fund of knowledge: Average    PMH/PSH:  Past Medical History:   Diagnosis Date    Acute vaginitis     Vaginal infection    Gangrene, not elsewhere classified     Necrosis    Other conditions influencing health status     History of pregnancy    Other conditions influencing health status     Teen pregnancy    Other specified acute skin changes due to ultraviolet radiation     Photosensitivity    Other specified noninflammatory disorders of vagina 04/06/2015    Vaginal dryness    Personal history of gestational diabetes     History of gestational diabetes    Personal history of other diseases of the musculoskeletal system and connective tissue 01/30/2018    History of fibromyalgia    Personal history of other diseases of the musculoskeletal system and connective tissue     Personal history of osteomyelitis    Personal history of other endocrine, nutritional and metabolic disease 12/29/2014    " History of hyperparathyroidism    Personal history of other infectious and parasitic diseases     History of gonorrhea    Personal history of other medical treatment     H/O mammogram        Meds  Current Outpatient Medications on File Prior to Visit   Medication Sig Dispense Refill    acetaminophen (Tylenol) 325 mg tablet Take 2 tablets (650 mg) by mouth every 6 hours if needed.      aspirin-acetaminophen-caffeine (Excedrin Migraine) 250-250-65 mg tablet Take 1 tablet by mouth every 6 hours if needed for headaches.      buPROPion SR (Wellbutrin SR) 100 mg 12 hr tablet Take one tablet daily in afternoonDo not crush, chew, or split. 90 tablet 1    buPROPion SR (Wellbutrin SR) 150 mg 12 hr tablet Take one daily in the morning 90 tablet 1    dapagliflozin propanediol (Farxiga) 10 mg Take 1 tablet (10 mg) by mouth once daily. 100 tablet 0    diclofenac sodium 1 % kit Place on the skin once daily.      diphenhydrAMINE 12.5 mg/5 mL liquid Take by mouth.      DULoxetine (Cymbalta) 60 mg DR capsule Take 1 capsule (60 mg) by mouth 2 times a day. 180 capsule 1    famotidine (Pepcid) 40 mg tablet Take 1 tablet (40 mg) by mouth once daily at bedtime. 90 tablet 1    lisinopril 40 mg tablet Take 1 tablet (40 mg) by mouth once daily. 90 tablet 0    mirtazapine (Remeron) 7.5 mg tablet Take half to one tablet at bedtime. (Patient taking differently: Take 3.25 mg by mouth as needed at bedtime. Take half to one tablet at bedtime.) 30 tablet 1    multivitamin with minerals (multivit-min-iron fum-folic ac) tablet Take 1 tablet by mouth once daily.      omega-3 fatty acids-fish oil (Fish OiL) 340-1,000 mg capsule Take by mouth.      traMADol (Ultram) 50 mg tablet Take 1 tablet (50 mg) by mouth 2 times a day. 60 tablet 0    vit C,Y-Ar-pohnh-lutein-zeaxan (PreserVision AREDS-2) 250-90-40-1 mg capsule Take by mouth twice a day.      [DISCONTINUED] traMADol (Ultram) 50 mg tablet Take 1 tablet (50 mg) by mouth 2 times a day. 60 tablet 0      No current facility-administered medications on file prior to visit.        Allergies:   Allergies   Allergen Reactions    Citrus And Derivatives Itching    Nafcillin Other    Pregabalin Swelling and Unknown    Sulfa (Sulfonamide Antibiotics) Other    Sulfamethoxazole-Trimethoprim Rash       Assessment/Plan   Assessment:   Ms. Roselyn Muhammad is a 69-year-old woman with a history of insomnia, depression, fibromyalgia, hypertension, SLE, Raynaud's phenomena, hypertension, monoclonal gammopathy, weight gain and chronic kidney disease. Follow up done virtually today.      3/18/25 - Mood is stable but low motivation and energy. No recent AVH.   Had slightly elevated liver enzymes on 2/7/25 but normalized on 2/27/25.      Diagnosis:   Other specified depressive disorder, recurrent, in partial remission  Insomnia    Treatment Plan/Recommendations:   - Continue bupropion SR 150mg in morning and bupropion 100mg in the afternoon.   - Continue Cymbalta 60mg twice daily.  - May take mirtazapine 7.5mg half to one tablet at bedtime as needed for sleep.   - Educated on sleep hygiene.   - Encouraged healthy lifestyle including regular exercise.   - Follow up in about 6 months.   - Contact sooner if needed.     Bertha Jung MD.

## 2025-03-18 NOTE — PATIENT INSTRUCTIONS
Plan:   - Continue bupropion SR 150mg in morning and bupropion 100mg in the afternoon.   - Continue Cymbalta 60mg twice daily.  - May take mirtazapine 7.5mg half to one tablet at bedtime as needed for sleep.   - Make sure you have consistent sleep routine - following the same steps every night before sleeping, going to bed at the same time each night and waking up at the same time every morning. Try to get about 8 hours of uninterrupted sleep each night. Unplug from electronics 30-60 minutes before bedtime. Avoid taking naps during the daytime.  - Follow up in about 6 months. Call [phone] to schedule.   - Contact via [employer] or call sooner ([phone]) in case of any questions or concerns.  - Call 108 for mental health crisis or suicidal thoughts, or call 541 or go to the nearest emergency room for emergencies.    Brain-healthy lifestyle:   - Make sure your medical conditions (if any) such as diabetes, high blood pressure, high cholesterol, thyroid disease sleep apnea are optimally controlled.   - Use eyeglasses or hearing aids appropriately if needed.   - Eat a heart healthy diet (like a Mediterranean diet; lots of fruits and vegetables, fish; low fat;)  - Exercise regularly as tolerated.   - Maintain good sleep hygiene; avoid daytime naps; try to get 7 to 8 hours of continuous sleep at night.   - Stay mentally active - puzzles, word searches, books, playing cards.  - Stay socially active and engaged.

## 2025-03-26 ENCOUNTER — TELEPHONE (OUTPATIENT)
Dept: PRIMARY CARE | Facility: CLINIC | Age: 70
End: 2025-03-26
Payer: MEDICARE

## 2025-03-26 DIAGNOSIS — M25.562 CHRONIC PAIN OF LEFT KNEE: Primary | ICD-10-CM

## 2025-03-26 DIAGNOSIS — G89.29 CHRONIC PAIN OF LEFT KNEE: Primary | ICD-10-CM

## 2025-03-26 RX ORDER — DICLOFENAC SODIUM 10 MG/G
4 GEL TOPICAL 4 TIMES DAILY
Qty: 100 G | Refills: 1 | Status: SHIPPED | OUTPATIENT
Start: 2025-03-26

## 2025-03-26 NOTE — TELEPHONE ENCOUNTER
Patient is requesting an rx for diclofenac gel to be sent to the [facility] located at [address] [place] [address].

## 2025-03-27 ENCOUNTER — APPOINTMENT (OUTPATIENT)
Dept: PRIMARY CARE | Facility: CLINIC | Age: 70
End: 2025-03-27
Payer: MEDICARE

## 2025-04-02 ENCOUNTER — APPOINTMENT (OUTPATIENT)
Dept: PRIMARY CARE | Facility: CLINIC | Age: 70
End: 2025-04-02
Payer: MEDICARE

## 2025-04-04 ENCOUNTER — APPOINTMENT (OUTPATIENT)
Dept: PRIMARY CARE | Facility: CLINIC | Age: 70
End: 2025-04-04
Payer: COMMERCIAL

## 2025-04-15 DIAGNOSIS — M47.816 LUMBAR SPONDYLOSIS: ICD-10-CM

## 2025-04-15 RX ORDER — TRAMADOL HYDROCHLORIDE 50 MG/1
50 TABLET ORAL 2 TIMES DAILY
Qty: 60 TABLET | Refills: 0 | Status: SHIPPED | OUTPATIENT
Start: 2025-04-15 | End: 2025-05-15

## 2025-04-15 NOTE — TELEPHONE ENCOUNTER
Pt called for medication refill Tramadol 50 mg take one tablet BID  to be filled to preferred pharmacy. Last follow up 1/9/25, last refill 3/17/25. UDS/CSA 9/5/25. OARRs reviewed.

## 2025-05-05 ENCOUNTER — TELEPHONE (OUTPATIENT)
Dept: PRIMARY CARE | Facility: CLINIC | Age: 70
End: 2025-05-05
Payer: MEDICARE

## 2025-05-05 NOTE — TELEPHONE ENCOUNTER
Patient is requesting if she can be seen before the 14th of May. She stated she is getting her teeth pulled that day. She stated she would like a call to the number [phone].

## 2025-05-12 DIAGNOSIS — M47.816 LUMBAR SPONDYLOSIS: ICD-10-CM

## 2025-05-12 RX ORDER — TRAMADOL HYDROCHLORIDE 50 MG/1
50 TABLET ORAL 2 TIMES DAILY
Qty: 60 TABLET | Refills: 0 | Status: SHIPPED | OUTPATIENT
Start: 2025-05-12 | End: 2025-06-11

## 2025-05-12 NOTE — TELEPHONE ENCOUNTER
Pt called for medication refill Tramadol 50 mg take one tablet BID to be filled to preferred pharmacy. Last follow up 1/9/25, last refill 4/15/25. UDS/CSA 9/5/25. OARRs reviewed.

## 2025-05-13 ENCOUNTER — APPOINTMENT (OUTPATIENT)
Dept: PRIMARY CARE | Facility: CLINIC | Age: 70
End: 2025-05-13
Payer: COMMERCIAL

## 2025-05-13 DIAGNOSIS — R05.9 COUGH, UNSPECIFIED TYPE: ICD-10-CM

## 2025-05-13 DIAGNOSIS — J06.9 UPPER RESPIRATORY TRACT INFECTION, UNSPECIFIED TYPE: Primary | ICD-10-CM

## 2025-05-13 PROCEDURE — 99212 OFFICE O/P EST SF 10 MIN: CPT | Performed by: PEDIATRICS

## 2025-05-13 RX ORDER — BENZONATATE 200 MG/1
200 CAPSULE ORAL 3 TIMES DAILY PRN
Qty: 42 CAPSULE | Refills: 0 | Status: SHIPPED | OUTPATIENT
Start: 2025-05-13 | End: 2025-05-14

## 2025-05-13 NOTE — PROGRESS NOTES
Subjective   Patient ID: Roselyn Muhammad is a 69 y.o. female who presents via phone call for cold and cough    HPI   Cold, cough and wheezing has been for 2 days; she has stayed in bed mostly; she felt warm 2 days ago; she did not check her temperature. She did not do a Covid test on herself. She has an inhaler at home. Phlegm is clear;   Review of Systems    Objective   There were no vitals taken for this visit.    Physical Exam    Assessment/Plan   Problem List Items Addressed This Visit    None  Visit Diagnoses         Codes      Upper respiratory tract infection, unspecified type    -  Primary J06.9      Cough, unspecified type     R05.9    Relevant Medications    benzonatate (Tessalon) 200 mg capsule

## 2025-05-14 ENCOUNTER — TELEPHONE (OUTPATIENT)
Dept: PRIMARY CARE | Facility: CLINIC | Age: 70
End: 2025-05-14
Payer: COMMERCIAL

## 2025-05-14 NOTE — TELEPHONE ENCOUNTER
Patient called stating that her insurance is not covering the cough medication and they advised her to call to receive a different medication. She can be reached at [phone].

## 2025-05-14 NOTE — TELEPHONE ENCOUNTER
Symptoms better today; wheezing less often; did not take Covid test yet; told her we can see her if it is negative

## 2025-05-22 ENCOUNTER — TELEPHONE (OUTPATIENT)
Dept: BEHAVIORAL HEALTH | Facility: CLINIC | Age: 70
End: 2025-05-22

## 2025-05-22 ENCOUNTER — APPOINTMENT (OUTPATIENT)
Dept: PAIN MEDICINE | Facility: CLINIC | Age: 70
End: 2025-05-22
Payer: COMMERCIAL

## 2025-05-22 DIAGNOSIS — F32.89 OTHER DEPRESSION: ICD-10-CM

## 2025-05-22 DIAGNOSIS — F32.A DEPRESSION, UNSPECIFIED DEPRESSION TYPE: ICD-10-CM

## 2025-05-22 RX ORDER — DULOXETIN HYDROCHLORIDE 60 MG/1
60 CAPSULE, DELAYED RELEASE ORAL DAILY
Qty: 30 CAPSULE | Refills: 0 | Status: SHIPPED | OUTPATIENT
Start: 2025-05-22 | End: 2026-05-22

## 2025-05-22 RX ORDER — BUPROPION HYDROCHLORIDE 100 MG/1
TABLET, EXTENDED RELEASE ORAL
Qty: 30 TABLET | Refills: 0 | Status: SHIPPED | OUTPATIENT
Start: 2025-05-22

## 2025-05-22 RX ORDER — BUPROPION HYDROCHLORIDE 150 MG/1
150 TABLET, EXTENDED RELEASE ORAL EVERY MORNING
Qty: 30 TABLET | Refills: 0 | Status: SHIPPED | OUTPATIENT
Start: 2025-05-22 | End: 2025-06-21

## 2025-05-22 RX ORDER — BUPROPION HYDROCHLORIDE 150 MG/1
TABLET, EXTENDED RELEASE ORAL
Qty: 90 TABLET | Refills: 1 | Status: SHIPPED | OUTPATIENT
Start: 2025-05-22

## 2025-05-22 RX ORDER — DULOXETIN HYDROCHLORIDE 60 MG/1
60 CAPSULE, DELAYED RELEASE ORAL 2 TIMES DAILY
Qty: 180 CAPSULE | Refills: 1 | Status: SHIPPED | OUTPATIENT
Start: 2025-05-22

## 2025-05-22 RX ORDER — BUPROPION HYDROCHLORIDE 100 MG/1
TABLET, EXTENDED RELEASE ORAL
Qty: 90 TABLET | Refills: 1 | Status: SHIPPED | OUTPATIENT
Start: 2025-05-22

## 2025-05-29 ENCOUNTER — APPOINTMENT (OUTPATIENT)
Dept: PAIN MEDICINE | Facility: CLINIC | Age: 70
End: 2025-05-29
Payer: MEDICARE

## 2025-05-29 DIAGNOSIS — M47.816 LUMBAR SPONDYLOSIS: ICD-10-CM

## 2025-05-29 PROCEDURE — 99213 OFFICE O/P EST LOW 20 MIN: CPT | Performed by: PAIN MEDICINE

## 2025-05-29 RX ORDER — TRAMADOL HYDROCHLORIDE 50 MG/1
50 TABLET, FILM COATED ORAL 2 TIMES DAILY
Qty: 60 TABLET | Refills: 0 | Status: SHIPPED | OUTPATIENT
Start: 2025-05-29 | End: 2025-06-28

## 2025-05-29 NOTE — PROGRESS NOTES
5/29/2025    Virtual or Telephone Consent    An interactive audio and video telecommunication system which permits real time communications between the patient (at the originating site) and provider (at the distant site) was utilized to provide this telehealth service.   Verbal consent was requested and obtained from Roselyn Muhammad on this date, 05/29/25 for a telehealth visit and the patient's location was confirmed at the time of the visit.      Trial of video interview failed as pt is not able to sign in. Change to phone interview     Ms. Muhammad  is 69 y lady well known to my clinic. She has been diagnosed with fibromyalgia, lupus and chronic pain syndrome. She has been maintained on tramadol tid and keep her pain under control. She is functioning to her capacity and has no new complain    Plan  Continue with tramadol as prescribed       MD Randy Barrientos MD   Resident  General Surgery     Progress Notes      Signed     Encounter Date: 1/9/2025     Signed         Virtual interview     69 y/o woman with history of long-standing low back and left leg pain, b/l shoulder arthritis, CKD, SLE, arthritis, fibromyalgia, insomnia, and depression who presents for a virtual follow-up visit.     Patient reports being out of tramadol for one week, used ibuprofen for pain control with mild improvement in pain. The pain is adequately controlled with tramadol, but on the days when she feels the pain exacerbates, she wishes she had a better relief in her symptoms. She has inquired about raising the dose of tramadol or switching to a different opiate medication. I explained it is not recommended at this time. Pt stated she has a CKD and asked how much ibuprofen she can take, I suggested she discusses this with her nephrologist.  She normally is doing home exercises, but has not done it recently.  Reports recent weight gain. She declined PT. I mentioned that we can send a referral if she  changes her mind.     She denies new weakness, sensory losses, bowel or bladder incontinence.  OARRS report reviewed and no violations noted. Unintentional overdose risc score is 40. Avg 14.7 MME/day.        Plan:     - Continue current treatment with tramadol 50 mg twice daily as needed. Will send RF  - Resume home exercises  - Patient has declined PT for now            Cosigned by: Malika Mena MD at 1/9/2025 12:49 PM   Electronically signed by Randy Stevenson MD at 1/9/2025 12:49 PM  Electronically signed by Malika Mena MD at 1/9/2025 12:49 PM         Telemedicine on 1/9/2025            Revision History          Note shared with patient  Additional Documentation    Flowsheets: Interfaced Flowsheet Data   Encounter Info: Billing Info,     History,     Allergies     Orders Placed    None  Medication Changes      None  Medication List  Visit Diagnoses      Arthritis  Problem List

## 2025-06-09 DIAGNOSIS — I10 BENIGN ESSENTIAL HYPERTENSION: ICD-10-CM

## 2025-06-10 RX ORDER — LISINOPRIL 40 MG/1
40 TABLET ORAL DAILY
Qty: 90 TABLET | Refills: 1 | Status: SHIPPED | OUTPATIENT
Start: 2025-06-10 | End: 2026-07-15

## 2025-06-19 ENCOUNTER — APPOINTMENT (OUTPATIENT)
Dept: PRIMARY CARE | Facility: CLINIC | Age: 70
End: 2025-06-19
Payer: MEDICARE

## 2025-06-20 ENCOUNTER — OFFICE VISIT (OUTPATIENT)
Dept: PRIMARY CARE | Facility: CLINIC | Age: 70
End: 2025-06-20
Payer: MEDICARE

## 2025-06-20 VITALS
HEART RATE: 85 BPM | TEMPERATURE: 97.1 F | SYSTOLIC BLOOD PRESSURE: 136 MMHG | DIASTOLIC BLOOD PRESSURE: 74 MMHG | WEIGHT: 195 LBS | OXYGEN SATURATION: 100 % | BODY MASS INDEX: 35.67 KG/M2

## 2025-06-20 DIAGNOSIS — G47.00 INSOMNIA, UNSPECIFIED TYPE: ICD-10-CM

## 2025-06-20 DIAGNOSIS — E66.812 CLASS 2 SEVERE OBESITY DUE TO EXCESS CALORIES WITH SERIOUS COMORBIDITY AND BODY MASS INDEX (BMI) OF 35.0 TO 35.9 IN ADULT: ICD-10-CM

## 2025-06-20 DIAGNOSIS — M32.19 SYSTEMIC LUPUS ERYTHEMATOSUS WITH OTHER ORGAN INVOLVEMENT, UNSPECIFIED SLE TYPE (MULTI): ICD-10-CM

## 2025-06-20 DIAGNOSIS — E66.01 CLASS 2 SEVERE OBESITY DUE TO EXCESS CALORIES WITH SERIOUS COMORBIDITY AND BODY MASS INDEX (BMI) OF 35.0 TO 35.9 IN ADULT: ICD-10-CM

## 2025-06-20 DIAGNOSIS — J45.20 MILD INTERMITTENT ASTHMA, UNSPECIFIED WHETHER COMPLICATED (HHS-HCC): ICD-10-CM

## 2025-06-20 DIAGNOSIS — I73.00 RAYNAUD'S DISEASE WITHOUT GANGRENE: ICD-10-CM

## 2025-06-20 DIAGNOSIS — D47.2 MONOCLONAL GAMMOPATHY: ICD-10-CM

## 2025-06-20 DIAGNOSIS — K29.70 GASTRITIS WITHOUT BLEEDING, UNSPECIFIED CHRONICITY, UNSPECIFIED GASTRITIS TYPE: ICD-10-CM

## 2025-06-20 DIAGNOSIS — H90.3 SENSORINEURAL HEARING LOSS, BILATERAL: ICD-10-CM

## 2025-06-20 DIAGNOSIS — G89.29 CHRONIC PAIN OF LEFT KNEE: ICD-10-CM

## 2025-06-20 DIAGNOSIS — M25.562 CHRONIC PAIN OF LEFT KNEE: ICD-10-CM

## 2025-06-20 DIAGNOSIS — I10 BENIGN ESSENTIAL HYPERTENSION: Primary | ICD-10-CM

## 2025-06-20 DIAGNOSIS — E55.9 VITAMIN D DEFICIENCY: ICD-10-CM

## 2025-06-20 PROCEDURE — 3078F DIAST BP <80 MM HG: CPT | Performed by: PEDIATRICS

## 2025-06-20 PROCEDURE — 3075F SYST BP GE 130 - 139MM HG: CPT | Performed by: PEDIATRICS

## 2025-06-20 PROCEDURE — 99214 OFFICE O/P EST MOD 30 MIN: CPT | Performed by: PEDIATRICS

## 2025-06-20 PROCEDURE — G2211 COMPLEX E/M VISIT ADD ON: HCPCS | Performed by: PEDIATRICS

## 2025-06-20 RX ORDER — FAMOTIDINE 40 MG/1
40 TABLET, FILM COATED ORAL NIGHTLY
Qty: 90 TABLET | Refills: 1 | Status: SHIPPED | OUTPATIENT
Start: 2025-06-20

## 2025-06-20 RX ORDER — ALBUTEROL SULFATE 90 UG/1
1 INHALANT RESPIRATORY (INHALATION) ONCE
OUTPATIENT
Start: 2025-06-20

## 2025-06-20 RX ORDER — DICLOFENAC SODIUM 10 MG/G
4 GEL TOPICAL 4 TIMES DAILY
Qty: 100 G | Refills: 1 | Status: SHIPPED | OUTPATIENT
Start: 2025-06-20

## 2025-06-20 RX ORDER — ALBUTEROL SULFATE 0.83 MG/ML
3 SOLUTION RESPIRATORY (INHALATION) ONCE
OUTPATIENT
Start: 2025-06-20 | End: 2025-06-20

## 2025-06-20 RX ORDER — ALBUTEROL SULFATE 90 UG/1
2 INHALANT RESPIRATORY (INHALATION) EVERY 4 HOURS PRN
Qty: 8.5 G | Refills: 0 | Status: SHIPPED | OUTPATIENT
Start: 2025-06-20 | End: 2026-06-20

## 2025-06-20 NOTE — PATIENT INSTRUCTIONS
Get home BP machine with large cuff  Avoid ice cream, cookies, cheese, chips, crackers, fast food.  See Dr Morgan's group  for your kidneys;   See Dr Little for rheumatology   Return in October  Do Hayde

## 2025-06-20 NOTE — PROGRESS NOTES
Subjective   Patient ID: Roselyn Muhammad is a 69 y.o. female who presents for HTN and CKD    HPI   Patient has pain in back; had a fall 2 days ago and has some bruising of left knee, calf, and left arm.  Never smoked; no drinking  Review of Systems  No CP  Wheezes at night sometimes; had it when she had the flu months ago; started a month ago; notices it when exposed to mold in the hallway of her apartment  Objective   /74   Pulse 85   Temp 36.2 °C (97.1 °F)   Wt 88.5 kg (195 lb)   SpO2 100%   BMI 35.67 kg/m²     Physical Exam  HEENT neg  Lungs clear  Heart RRR   Abd soft  Assessment/Plan   Problem List Items Addressed This Visit           ICD-10-CM    Benign essential hypertension - Primary I10    Insomnia G47.00    Mirtazapine half tab helps when she takes it         Left knee pain M25.562    Relevant Medications    diclofenac sodium (Voltaren) 1 % gel    Monoclonal gammopathy D47.2    Relevant Orders    Referral To Hematology and Oncology    Raynaud's disease I73.00    Only if too cold, feet and hands are affected;         Sensorineural hearing loss, bilateral H90.3    Wears hearing aides         Systemic lupus erythematosus (Multi) M32.9    Vitamin D deficiency E55.9    Class 2 severe obesity due to excess calories with serious comorbidity and body mass index (BMI) of 35.0 to 35.9 in adult E66.812, E66.01, Z68.35    Gastritis K29.70    Relevant Medications    famotidine (Pepcid) 40 mg tablet     Other Visit Diagnoses         Codes      Mild intermittent asthma, unspecified whether complicated ([facility])     J45.20    Relevant Medications    albuterol (ProAir HFA) 90 mcg/actuation inhaler    Other Relevant Orders    Respiratory Allergy Profile IgE    Complete Pulmonary Function Test (Spirometry/DLCO/Lung Volumes)

## 2025-07-14 ENCOUNTER — TELEPHONE (OUTPATIENT)
Dept: PRIMARY CARE | Facility: CLINIC | Age: 70
End: 2025-07-14
Payer: MEDICARE

## 2025-07-14 NOTE — TELEPHONE ENCOUNTER
Ms. Muhammad called office with request for a referral to dermatologist-itchy on arms only with a rash with skin breaking open.      Phone: [phone]

## 2025-07-21 DIAGNOSIS — M79.7 FIBROMYALGIA: ICD-10-CM

## 2025-07-21 DIAGNOSIS — M47.816 LUMBAR SPONDYLOSIS: ICD-10-CM

## 2025-07-21 RX ORDER — TRAMADOL HYDROCHLORIDE 50 MG/1
50 TABLET, FILM COATED ORAL 2 TIMES DAILY
Qty: 60 TABLET | Refills: 0 | Status: SHIPPED | OUTPATIENT
Start: 2025-07-21 | End: 2025-08-20

## 2025-07-21 NOTE — TELEPHONE ENCOUNTER
Pt called for medication refill Tramadol 50 mg   to be filled to preferred pharmacy.  Last follow up 5/29/25, last refill 6/13/25. UDS/CSA 9/5/24 reviewed OARRs patient received hydrocodone acetaminophen 5/325 16 tablets for 4 days on 6/25/25 reason is unknown. Will discuss the importance of notifying pain office when ever being prescribed a controlled

## 2025-07-25 ENCOUNTER — TELEPHONE (OUTPATIENT)
Dept: PRIMARY CARE | Facility: CLINIC | Age: 70
End: 2025-07-25
Payer: MEDICARE

## 2025-08-07 DIAGNOSIS — L29.9 SEVERE ITCHING: Primary | ICD-10-CM

## 2025-08-13 ENCOUNTER — HOSPITAL ENCOUNTER (EMERGENCY)
Facility: HOSPITAL | Age: 70
Discharge: HOME | End: 2025-08-13
Attending: EMERGENCY MEDICINE
Payer: MEDICARE

## 2025-08-13 ENCOUNTER — CLINICAL SUPPORT (OUTPATIENT)
Dept: EMERGENCY MEDICINE | Facility: HOSPITAL | Age: 70
End: 2025-08-13
Payer: MEDICARE

## 2025-08-13 ENCOUNTER — APPOINTMENT (OUTPATIENT)
Dept: RADIOLOGY | Facility: HOSPITAL | Age: 70
End: 2025-08-13
Payer: MEDICARE

## 2025-08-13 VITALS
OXYGEN SATURATION: 99 % | HEART RATE: 95 BPM | TEMPERATURE: 98.4 F | WEIGHT: 195 LBS | HEIGHT: 62 IN | SYSTOLIC BLOOD PRESSURE: 113 MMHG | BODY MASS INDEX: 35.88 KG/M2 | DIASTOLIC BLOOD PRESSURE: 60 MMHG | RESPIRATION RATE: 16 BRPM

## 2025-08-13 DIAGNOSIS — R06.02 SOB (SHORTNESS OF BREATH): ICD-10-CM

## 2025-08-13 DIAGNOSIS — M47.816 LUMBAR SPONDYLOSIS: ICD-10-CM

## 2025-08-13 DIAGNOSIS — R21 RASH: Primary | ICD-10-CM

## 2025-08-13 DIAGNOSIS — M79.7 FIBROMYALGIA: ICD-10-CM

## 2025-08-13 LAB
ANION GAP SERPL CALC-SCNC: 13 MMOL/L (ref 10–20)
BASOPHILS # BLD AUTO: 0.02 X10*3/UL (ref 0–0.1)
BASOPHILS NFR BLD AUTO: 0.2 %
BNP SERPL-MCNC: 29 PG/ML (ref 0–99)
BUN SERPL-MCNC: 12 MG/DL (ref 6–23)
CALCIUM SERPL-MCNC: 8.8 MG/DL (ref 8.6–10.6)
CARDIAC TROPONIN I PNL SERPL HS: 17 NG/L (ref 0–34)
CHLORIDE SERPL-SCNC: 103 MMOL/L (ref 98–107)
CO2 SERPL-SCNC: 24 MMOL/L (ref 21–32)
CREAT SERPL-MCNC: 1.51 MG/DL (ref 0.5–1.05)
EGFRCR SERPLBLD CKD-EPI 2021: 37 ML/MIN/1.73M*2
EOSINOPHIL # BLD AUTO: 0.32 X10*3/UL (ref 0–0.4)
EOSINOPHIL NFR BLD AUTO: 3.9 %
ERYTHROCYTE [DISTWIDTH] IN BLOOD BY AUTOMATED COUNT: 13.9 % (ref 11.5–14.5)
GLUCOSE SERPL-MCNC: 79 MG/DL (ref 74–99)
HCT VFR BLD AUTO: 39 % (ref 36–46)
HGB BLD-MCNC: 13.2 G/DL (ref 12–16)
IMM GRANULOCYTES # BLD AUTO: 0.03 X10*3/UL (ref 0–0.5)
IMM GRANULOCYTES NFR BLD AUTO: 0.4 % (ref 0–0.9)
LYMPHOCYTES # BLD AUTO: 1.06 X10*3/UL (ref 0.8–3)
LYMPHOCYTES NFR BLD AUTO: 13.1 %
MCH RBC QN AUTO: 32.6 PG (ref 26–34)
MCHC RBC AUTO-ENTMCNC: 33.8 G/DL (ref 32–36)
MCV RBC AUTO: 96 FL (ref 80–100)
MONOCYTES # BLD AUTO: 1.03 X10*3/UL (ref 0.05–0.8)
MONOCYTES NFR BLD AUTO: 12.7 %
NEUTROPHILS # BLD AUTO: 5.66 X10*3/UL (ref 1.6–5.5)
NEUTROPHILS NFR BLD AUTO: 69.7 %
NRBC BLD-RTO: 0 /100 WBCS (ref 0–0)
PLATELET # BLD AUTO: 239 X10*3/UL (ref 150–450)
POTASSIUM SERPL-SCNC: 4.5 MMOL/L (ref 3.5–5.3)
RBC # BLD AUTO: 4.05 X10*6/UL (ref 4–5.2)
SODIUM SERPL-SCNC: 135 MMOL/L (ref 136–145)
WBC # BLD AUTO: 8.1 X10*3/UL (ref 4.4–11.3)

## 2025-08-13 PROCEDURE — 82374 ASSAY BLOOD CARBON DIOXIDE: CPT

## 2025-08-13 PROCEDURE — 71046 X-RAY EXAM CHEST 2 VIEWS: CPT | Performed by: STUDENT IN AN ORGANIZED HEALTH CARE EDUCATION/TRAINING PROGRAM

## 2025-08-13 PROCEDURE — 36415 COLL VENOUS BLD VENIPUNCTURE: CPT

## 2025-08-13 PROCEDURE — 93005 ELECTROCARDIOGRAM TRACING: CPT

## 2025-08-13 PROCEDURE — 84484 ASSAY OF TROPONIN QUANT: CPT

## 2025-08-13 PROCEDURE — 71046 X-RAY EXAM CHEST 2 VIEWS: CPT

## 2025-08-13 PROCEDURE — 83880 ASSAY OF NATRIURETIC PEPTIDE: CPT | Performed by: EMERGENCY MEDICINE

## 2025-08-13 PROCEDURE — 99285 EMERGENCY DEPT VISIT HI MDM: CPT | Mod: 25 | Performed by: EMERGENCY MEDICINE

## 2025-08-13 PROCEDURE — 85025 COMPLETE CBC W/AUTO DIFF WBC: CPT

## 2025-08-13 RX ORDER — PREDNISONE 20 MG/1
40 TABLET ORAL ONCE
Status: DISCONTINUED | OUTPATIENT
Start: 2025-08-13 | End: 2025-08-13 | Stop reason: HOSPADM

## 2025-08-13 RX ORDER — HYDROCORTISONE 1 %
CREAM (GRAM) TOPICAL 2 TIMES DAILY
Status: DISCONTINUED | OUTPATIENT
Start: 2025-08-13 | End: 2025-08-13 | Stop reason: HOSPADM

## 2025-08-13 RX ORDER — HYDROCORTISONE 1 %
1 CREAM (GRAM) TOPICAL 2 TIMES DAILY
Qty: 6 G | Refills: 0 | Status: SHIPPED | OUTPATIENT
Start: 2025-08-13 | End: 2025-09-12

## 2025-08-13 RX ORDER — TRAMADOL HYDROCHLORIDE 50 MG/1
50 TABLET, FILM COATED ORAL 2 TIMES DAILY
Qty: 60 TABLET | Refills: 0 | Status: SHIPPED | OUTPATIENT
Start: 2025-08-13 | End: 2025-09-12

## 2025-08-13 RX ORDER — PREDNISONE 10 MG/1
40 TABLET ORAL DAILY
Qty: 16 TABLET | Refills: 0 | Status: SHIPPED | OUTPATIENT
Start: 2025-08-13 | End: 2025-08-17

## 2025-08-13 ASSESSMENT — PAIN SCALES - GENERAL: PAINLEVEL_OUTOF10: 0 - NO PAIN

## 2025-08-13 ASSESSMENT — PAIN - FUNCTIONAL ASSESSMENT: PAIN_FUNCTIONAL_ASSESSMENT: 0-10

## 2025-08-14 ENCOUNTER — PATIENT OUTREACH (OUTPATIENT)
Dept: CARE COORDINATION | Facility: CLINIC | Age: 70
End: 2025-08-14
Payer: MEDICARE

## 2025-08-28 ENCOUNTER — TELEPHONE (OUTPATIENT)
Dept: PRIMARY CARE | Facility: CLINIC | Age: 70
End: 2025-08-28
Payer: MEDICARE

## 2025-09-02 ENCOUNTER — TELEPHONE (OUTPATIENT)
Dept: PRIMARY CARE | Facility: CLINIC | Age: 70
End: 2025-09-02
Payer: MEDICARE

## 2025-09-02 DIAGNOSIS — I10 BENIGN ESSENTIAL HYPERTENSION: ICD-10-CM

## 2025-09-02 DIAGNOSIS — K29.70 GASTRITIS WITHOUT BLEEDING, UNSPECIFIED CHRONICITY, UNSPECIFIED GASTRITIS TYPE: ICD-10-CM

## 2025-09-02 RX ORDER — FAMOTIDINE 40 MG/1
40 TABLET, FILM COATED ORAL NIGHTLY
Qty: 90 TABLET | Refills: 1 | Status: SHIPPED | OUTPATIENT
Start: 2025-09-02

## 2025-09-02 RX ORDER — LISINOPRIL 40 MG/1
40 TABLET ORAL DAILY
Qty: 90 TABLET | Refills: 1 | OUTPATIENT
Start: 2025-09-02 | End: 2026-10-07

## 2025-09-02 RX ORDER — FAMOTIDINE 40 MG/1
40 TABLET, FILM COATED ORAL NIGHTLY
Qty: 90 TABLET | Refills: 1 | OUTPATIENT
Start: 2025-09-02

## 2025-09-02 RX ORDER — LISINOPRIL 40 MG/1
40 TABLET ORAL DAILY
Qty: 90 TABLET | Refills: 1 | Status: SHIPPED | OUTPATIENT
Start: 2025-09-02 | End: 2026-10-07

## 2025-09-23 ENCOUNTER — APPOINTMENT (OUTPATIENT)
Dept: PAIN MEDICINE | Facility: CLINIC | Age: 70
End: 2025-09-23
Payer: MEDICARE

## 2025-09-29 ENCOUNTER — APPOINTMENT (OUTPATIENT)
Dept: DERMATOLOGY | Facility: HOSPITAL | Age: 70
End: 2025-09-29
Payer: MEDICARE

## 2025-09-30 ENCOUNTER — APPOINTMENT (OUTPATIENT)
Dept: PAIN MEDICINE | Facility: CLINIC | Age: 70
End: 2025-09-30
Payer: MEDICARE

## 2025-10-01 ENCOUNTER — APPOINTMENT (OUTPATIENT)
Dept: RHEUMATOLOGY | Facility: CLINIC | Age: 70
End: 2025-10-01
Payer: MEDICARE

## 2025-10-08 ENCOUNTER — APPOINTMENT (OUTPATIENT)
Dept: RHEUMATOLOGY | Facility: CLINIC | Age: 70
End: 2025-10-08
Payer: MEDICARE

## 2025-10-09 ENCOUNTER — APPOINTMENT (OUTPATIENT)
Dept: PRIMARY CARE | Facility: CLINIC | Age: 70
End: 2025-10-09
Payer: MEDICARE

## 2025-10-13 ENCOUNTER — APPOINTMENT (OUTPATIENT)
Dept: RHEUMATOLOGY | Facility: CLINIC | Age: 70
End: 2025-10-13
Payer: MEDICARE